# Patient Record
Sex: FEMALE | Race: WHITE | Employment: FULL TIME | URBAN - NONMETROPOLITAN AREA
[De-identification: names, ages, dates, MRNs, and addresses within clinical notes are randomized per-mention and may not be internally consistent; named-entity substitution may affect disease eponyms.]

---

## 2017-01-16 ENCOUNTER — TELEPHONE (OUTPATIENT)
Dept: FAMILY MEDICINE CLINIC | Age: 67
End: 2017-01-16

## 2017-02-02 RX ORDER — NIFEDIPINE 60 MG/1
60 TABLET, EXTENDED RELEASE ORAL 2 TIMES DAILY
Qty: 60 TABLET | Refills: 0 | Status: SHIPPED | OUTPATIENT
Start: 2017-02-02 | End: 2017-02-21 | Stop reason: SDUPTHER

## 2017-02-04 LAB
ANION GAP SERPL CALCULATED.3IONS-SCNC: 14 MMOL/L
BUN BLDV-MCNC: 22 MG/DL (ref 10–20)
CALCIUM SERPL-MCNC: 9.2 MG/DL (ref 8.7–10.8)
CHLORIDE BLD-SCNC: 107 MMOL/L (ref 95–111)
CO2: 26 MMOL/L (ref 21–32)
CREAT SERPL-MCNC: 2.1 MG/DL (ref 0.5–1.3)
EGFR AFRICAN AMERICAN: 28
EGFR IF NONAFRICAN AMERICAN: 23
GLUCOSE: 95 MG/DL (ref 70–100)
POTASSIUM SERPL-SCNC: 3.8 MMOL/L (ref 3.5–5.4)
SODIUM BLD-SCNC: 143 MMOL/L (ref 134–147)

## 2017-02-05 LAB
CREATINE, URINE: 207.4 MG/DL
PROTEIN, URINE: 51 MG/DL

## 2017-02-09 ENCOUNTER — OFFICE VISIT (OUTPATIENT)
Dept: NEPHROLOGY | Age: 67
End: 2017-02-09

## 2017-02-09 VITALS
HEART RATE: 63 BPM | BODY MASS INDEX: 40.86 KG/M2 | DIASTOLIC BLOOD PRESSURE: 92 MMHG | WEIGHT: 227 LBS | OXYGEN SATURATION: 98 % | SYSTOLIC BLOOD PRESSURE: 164 MMHG

## 2017-02-09 DIAGNOSIS — N25.81 SECONDARY HYPERPARATHYROIDISM OF RENAL ORIGIN (HCC): ICD-10-CM

## 2017-02-09 DIAGNOSIS — N18.4 CKD (CHRONIC KIDNEY DISEASE), STAGE IV (HCC): Primary | ICD-10-CM

## 2017-02-09 DIAGNOSIS — I10 ESSENTIAL HYPERTENSION: ICD-10-CM

## 2017-02-09 PROCEDURE — 99213 OFFICE O/P EST LOW 20 MIN: CPT | Performed by: INTERNAL MEDICINE

## 2017-02-09 RX ORDER — ERGOCALCIFEROL (VITAMIN D2) 1250 MCG
50000 CAPSULE ORAL WEEKLY
Qty: 4 CAPSULE | Refills: 3 | Status: SHIPPED | OUTPATIENT
Start: 2017-02-09 | End: 2017-06-08 | Stop reason: SDUPTHER

## 2017-02-10 DIAGNOSIS — I10 UNCONTROLLED HYPERTENSION: ICD-10-CM

## 2017-02-10 RX ORDER — METOPROLOL TARTRATE 50 MG/1
50 TABLET, FILM COATED ORAL 2 TIMES DAILY
Qty: 180 TABLET | Refills: 3 | Status: SHIPPED | OUTPATIENT
Start: 2017-02-10 | End: 2017-08-15 | Stop reason: SDUPTHER

## 2017-02-10 RX ORDER — DOXAZOSIN MESYLATE 4 MG/1
8 TABLET ORAL NIGHTLY
Qty: 60 TABLET | Refills: 3 | Status: SHIPPED | OUTPATIENT
Start: 2017-02-10 | End: 2018-06-13 | Stop reason: SDUPTHER

## 2017-02-10 RX ORDER — CALCITRIOL 0.25 UG/1
0.25 CAPSULE, LIQUID FILLED ORAL DAILY
Qty: 30 CAPSULE | Refills: 3 | Status: SHIPPED | OUTPATIENT
Start: 2017-02-10 | End: 2017-12-14

## 2017-02-14 RX ORDER — HYDRALAZINE HYDROCHLORIDE 25 MG/1
25 TABLET, FILM COATED ORAL 3 TIMES DAILY
Qty: 270 TABLET | Refills: 1 | Status: SHIPPED | OUTPATIENT
Start: 2017-02-14 | End: 2018-08-16 | Stop reason: SDUPTHER

## 2017-02-21 RX ORDER — NIFEDIPINE 60 MG/1
60 TABLET, EXTENDED RELEASE ORAL 2 TIMES DAILY
Qty: 180 TABLET | Refills: 3 | Status: SHIPPED | OUTPATIENT
Start: 2017-02-21 | End: 2018-02-07 | Stop reason: SDUPTHER

## 2017-03-10 LAB
PARATHYROID HORMONE INTACT: 52 PG/ML (ref 11–67)
VITAMIN D 25-HYDROXY: 8 NG/ML

## 2017-04-24 ENCOUNTER — TELEPHONE (OUTPATIENT)
Dept: FAMILY MEDICINE CLINIC | Age: 67
End: 2017-04-24

## 2017-06-01 LAB
ANION GAP SERPL CALCULATED.3IONS-SCNC: 16 MMOL/L
BUN BLDV-MCNC: 27 MG/DL (ref 10–20)
CALCIUM SERPL-MCNC: 9.1 MG/DL (ref 8.7–10.8)
CHLORIDE BLD-SCNC: 107 MMOL/L (ref 95–111)
CO2: 24 MMOL/L (ref 21–32)
CREAT SERPL-MCNC: 2.1 MG/DL (ref 0.5–1.3)
EGFR AFRICAN AMERICAN: 28
EGFR IF NONAFRICAN AMERICAN: 23
GLUCOSE: 94 MG/DL (ref 70–100)
POTASSIUM SERPL-SCNC: 4.1 MMOL/L (ref 3.5–5.4)
SODIUM BLD-SCNC: 143 MMOL/L (ref 134–147)

## 2017-06-02 LAB
ABSOLUTE BASO #: 0.1 K/UL (ref 0–0.1)
ABSOLUTE EOS #: 0.3 K/UL (ref 0.1–0.4)
ABSOLUTE LYMPH #: 2 K/UL (ref 0.8–5.2)
ABSOLUTE MONO #: 0.5 K/UL (ref 0.1–0.9)
ABSOLUTE NEUT #: 3.4 K/UL (ref 1.3–9.1)
BASOPHILS RELATIVE PERCENT: 1 %
EOSINOPHILS RELATIVE PERCENT: 4.4 %
HCT VFR BLD CALC: 36.8 % (ref 36–48)
HEMOGLOBIN: 11.8 G/DL (ref 12–16)
LYMPHOCYTE %: 32.3 %
MCH RBC QN AUTO: 30.5 PG (ref 27–34)
MCHC RBC AUTO-ENTMCNC: 32.1 G/DL (ref 31–36)
MCV RBC AUTO: 95.1 FL (ref 80–100)
MONOCYTES # BLD: 7.9 %
NEUTROPHILS RELATIVE PERCENT: 54.2 %
PDW BLD-RTO: 13.1 % (ref 10.8–14.8)
PLATELETS: 181 K/UL (ref 150–450)
RBC: 3.87 M/UL (ref 4–5.5)
WBC: 6.2 K/UL (ref 3.7–10.8)

## 2017-06-03 LAB — VITAMIN D 25-HYDROXY: 23 NG/ML

## 2017-06-08 ENCOUNTER — OFFICE VISIT (OUTPATIENT)
Dept: NEPHROLOGY | Age: 67
End: 2017-06-08

## 2017-06-08 VITALS
OXYGEN SATURATION: 97 % | DIASTOLIC BLOOD PRESSURE: 84 MMHG | BODY MASS INDEX: 40.95 KG/M2 | HEART RATE: 63 BPM | SYSTOLIC BLOOD PRESSURE: 164 MMHG | WEIGHT: 227.5 LBS

## 2017-06-08 DIAGNOSIS — N18.4 CKD (CHRONIC KIDNEY DISEASE), STAGE IV (HCC): Primary | ICD-10-CM

## 2017-06-08 DIAGNOSIS — I10 ESSENTIAL HYPERTENSION: ICD-10-CM

## 2017-06-08 DIAGNOSIS — N25.81 SECONDARY HYPERPARATHYROIDISM OF RENAL ORIGIN (HCC): ICD-10-CM

## 2017-06-08 PROCEDURE — 99214 OFFICE O/P EST MOD 30 MIN: CPT | Performed by: INTERNAL MEDICINE

## 2017-06-08 RX ORDER — ERGOCALCIFEROL (VITAMIN D2) 1250 MCG
50000 CAPSULE ORAL WEEKLY
Qty: 4 CAPSULE | Refills: 2 | Status: SHIPPED | OUTPATIENT
Start: 2017-06-08 | End: 2017-08-18 | Stop reason: SDUPTHER

## 2017-06-21 RX ORDER — DOXAZOSIN MESYLATE 4 MG/1
8 TABLET ORAL DAILY
Qty: 60 TABLET | Refills: 5 | Status: SHIPPED | OUTPATIENT
Start: 2017-06-21 | End: 2019-07-31 | Stop reason: ALTCHOICE

## 2017-06-21 RX ORDER — CALCITRIOL 0.25 UG/1
0.25 CAPSULE, LIQUID FILLED ORAL DAILY
Qty: 30 CAPSULE | Refills: 5 | Status: SHIPPED | OUTPATIENT
Start: 2017-06-21 | End: 2017-12-14

## 2017-06-29 ENCOUNTER — TELEPHONE (OUTPATIENT)
Dept: FAMILY MEDICINE CLINIC | Age: 67
End: 2017-06-29

## 2017-07-06 RX ORDER — HYDRALAZINE HYDROCHLORIDE 25 MG/1
25 TABLET, FILM COATED ORAL 3 TIMES DAILY
Qty: 90 TABLET | Refills: 3 | Status: SHIPPED | OUTPATIENT
Start: 2017-07-06 | End: 2017-08-28 | Stop reason: SDUPTHER

## 2017-07-17 ENCOUNTER — TELEPHONE (OUTPATIENT)
Dept: FAMILY MEDICINE CLINIC | Age: 67
End: 2017-07-17

## 2017-08-21 RX ORDER — ERGOCALCIFEROL 1.25 MG/1
CAPSULE ORAL
Qty: 4 CAPSULE | Refills: 2 | Status: SHIPPED | OUTPATIENT
Start: 2017-08-21 | End: 2017-12-14

## 2017-08-28 RX ORDER — HYDRALAZINE HYDROCHLORIDE 25 MG/1
TABLET, FILM COATED ORAL
Qty: 90 TABLET | Refills: 3 | Status: SHIPPED | OUTPATIENT
Start: 2017-08-28 | End: 2018-02-07 | Stop reason: SDUPTHER

## 2017-09-29 RX ORDER — HYDRALAZINE HYDROCHLORIDE 25 MG/1
TABLET, FILM COATED ORAL
Qty: 270 TABLET | Refills: 0 | Status: SHIPPED | OUTPATIENT
Start: 2017-09-29 | End: 2018-06-13 | Stop reason: ALTCHOICE

## 2017-12-10 LAB
ANION GAP SERPL CALCULATED.3IONS-SCNC: 12 MMOL/L
BUN BLDV-MCNC: 23 MG/DL (ref 10–20)
CALCIUM SERPL-MCNC: 9.1 MG/DL (ref 8.7–10.8)
CHLORIDE BLD-SCNC: 109 MMOL/L (ref 95–111)
CO2: 25 MMOL/L (ref 21–32)
CREAT SERPL-MCNC: 2 MG/DL (ref 0.5–1.3)
EGFR AFRICAN AMERICAN: 30
EGFR IF NONAFRICAN AMERICAN: 25
GLUCOSE: 96 MG/DL (ref 70–100)
POTASSIUM SERPL-SCNC: 4.5 MMOL/L (ref 3.5–5.4)
SODIUM BLD-SCNC: 141 MMOL/L (ref 134–147)

## 2017-12-14 ENCOUNTER — OFFICE VISIT (OUTPATIENT)
Dept: NEPHROLOGY | Age: 67
End: 2017-12-14
Payer: COMMERCIAL

## 2017-12-14 VITALS
OXYGEN SATURATION: 97 % | HEART RATE: 62 BPM | SYSTOLIC BLOOD PRESSURE: 138 MMHG | DIASTOLIC BLOOD PRESSURE: 84 MMHG | WEIGHT: 221 LBS | BODY MASS INDEX: 39.78 KG/M2

## 2017-12-14 DIAGNOSIS — N18.4 CKD (CHRONIC KIDNEY DISEASE), STAGE IV (HCC): Primary | ICD-10-CM

## 2017-12-14 DIAGNOSIS — N25.81 SECONDARY HYPERPARATHYROIDISM OF RENAL ORIGIN (HCC): ICD-10-CM

## 2017-12-14 DIAGNOSIS — I10 ESSENTIAL HYPERTENSION: ICD-10-CM

## 2017-12-14 PROCEDURE — 99213 OFFICE O/P EST LOW 20 MIN: CPT | Performed by: INTERNAL MEDICINE

## 2017-12-14 NOTE — PROGRESS NOTES
[facility]          Outpatient Follow-Up note         12/14/2017 9:01 AM    Patient Name:   Ramon Gillespie  YOB: 1950  Primary Care Physician:  Erasto Bah CNP     Chief Complaint / Reason for follow-up : Follow Up of chronic kidney disease     Interval History :  Patient seen and examined by me. Feels well. Denies any complaints at this time. Deya Snooks No chest pain shortness of breath at this time. Patient's blood pressures are doing reasonable at home.        Past History :  Past Medical History:   Diagnosis Date    Acquired hypothyroidism 8/30/2016    Cancer [facility])     cervical     Past Surgical History:   Procedure Laterality Date    KNEE SURGERY      ORTHOPEDIC SURGERY      TONSILLECTOMY          Medications :     Outpatient Prescriptions Marked as Taking for the 12/14/17 encounter (Office Visit) with Tayler Rodriguez MD   Medication Sig Dispense Refill    hydrALAZINE (APRESOLINE) 25 MG tablet TAKE ONE TABLET BY MOUTH THREE TIMES A  tablet 0    hydrALAZINE (APRESOLINE) 25 MG tablet TAKE ONE TABLET BY MOUTH THREE TIMES A DAY 90 tablet 3    vitamin D (ERGOCALCIFEROL) 93408 units CAPS capsule TAKE 1 CAPSULE BY MOUTH ONCE A WEEK 4 capsule 2    SYNTHROID 75 MCG tablet TAKE ONE TABLET BY MOUTH ONE TIME A DAY 30 tablet 5    metoprolol tartrate (LOPRESSOR) 50 MG tablet TAKE ONE TABLET BY MOUTH TWICE A  tablet 3    calcitRIOL (ROCALTROL) 0.25 MCG capsule Take 1 capsule by mouth daily 30 capsule 5    doxazosin (CARDURA) 4 MG tablet Take 2 tablets by mouth daily 60 tablet 5    NIFEdipine (NIFEDICAL XL) 60 MG extended release tablet Take 1 tablet by mouth 2 times daily 180 tablet 3    hydrALAZINE (APRESOLINE) 25 MG tablet Take 1 tablet by mouth 3 times daily 270 tablet 1    calcitRIOL (ROCALTROL) 0.25 MCG capsule Take 1 capsule by mouth daily 30 capsule 3    doxazosin (CARDURA) 4 MG tablet Take 2 tablets by mouth nightly 60 tablet 3    clonazePAM

## 2018-02-07 ENCOUNTER — TELEPHONE (OUTPATIENT)
Dept: FAMILY MEDICINE CLINIC | Age: 68
End: 2018-02-07

## 2018-02-07 DIAGNOSIS — E03.9 ACQUIRED HYPOTHYROIDISM: ICD-10-CM

## 2018-02-07 RX ORDER — NIFEDIPINE 60 MG/1
TABLET, EXTENDED RELEASE ORAL
Qty: 180 TABLET | Refills: 1 | Status: SHIPPED | OUTPATIENT
Start: 2018-02-07 | End: 2018-06-13 | Stop reason: SDUPTHER

## 2018-02-07 RX ORDER — LEVOTHYROXINE SODIUM 75 MCG
TABLET ORAL
Qty: 30 TABLET | Refills: 0 | Status: SHIPPED | OUTPATIENT
Start: 2018-02-07 | End: 2018-03-02 | Stop reason: SDUPTHER

## 2018-02-07 RX ORDER — HYDRALAZINE HYDROCHLORIDE 25 MG/1
TABLET, FILM COATED ORAL
Qty: 270 TABLET | Refills: 1 | Status: SHIPPED | OUTPATIENT
Start: 2018-02-07 | End: 2018-06-13 | Stop reason: SDUPTHER

## 2018-02-07 NOTE — TELEPHONE ENCOUNTER
Received a refill request for Nifedipine 60 mg, takes one tablet 2 times daily, a 90 day supply and hydralazine 25 mg, takes one tablet 3 times daily, a 90 day supply from [address] I gave a 90 day with R-1 on both medications and it will be sent to [facility]. The patient's next appointment is on 06-13-18 a 6 month follow up with Dr. Alina King. The last time Nifedipine 60 mg, take one tablet 2 times daily was written was on 02-21-17 #180 R-3 and sent to [facility]. The last time hydralazine 25 mg, take one tablet 3 times a day was written was on 09-29-17 #270 R-0 and sent to [facility]. Dr. Alina King, these two prescriptions are pending please sing if you approve.

## 2018-03-01 DIAGNOSIS — I10 UNCONTROLLED HYPERTENSION: ICD-10-CM

## 2018-03-01 RX ORDER — METOPROLOL TARTRATE 50 MG/1
TABLET, FILM COATED ORAL
Qty: 180 TABLET | Refills: 3 | Status: SHIPPED | OUTPATIENT
Start: 2018-03-01 | End: 2019-04-30 | Stop reason: SDUPTHER

## 2018-03-02 DIAGNOSIS — I10 ESSENTIAL HYPERTENSION: Primary | ICD-10-CM

## 2018-03-02 DIAGNOSIS — E03.9 ACQUIRED HYPOTHYROIDISM: ICD-10-CM

## 2018-03-02 RX ORDER — LEVOTHYROXINE SODIUM 0.07 MG/1
TABLET ORAL
Qty: 30 TABLET | Refills: 0 | Status: SHIPPED | OUTPATIENT
Start: 2018-03-02 | End: 2018-03-12 | Stop reason: SDUPTHER

## 2018-03-09 ENCOUNTER — TELEPHONE (OUTPATIENT)
Dept: FAMILY MEDICINE CLINIC | Age: 68
End: 2018-03-09

## 2018-03-09 LAB
CHOLESTEROL/HDL RATIO: 4.6
CHOLESTEROL: 207 MG/DL
HDLC SERPL-MCNC: 45 MG/DL
LDL CHOLESTEROL CALCULATED: 144 MG/DL
LDL/HDL RATIO: 3.2
TRIGL SERPL-MCNC: 88 MG/DL
TSH SERPL DL<=0.05 MIU/L-ACNC: 3.45 UIU/ML (ref 0.4–4.1)
VLDLC SERPL CALC-MCNC: 18 MG/DL

## 2018-03-12 ENCOUNTER — OFFICE VISIT (OUTPATIENT)
Dept: FAMILY MEDICINE CLINIC | Age: 68
End: 2018-03-12
Payer: COMMERCIAL

## 2018-03-12 VITALS
SYSTOLIC BLOOD PRESSURE: 136 MMHG | BODY MASS INDEX: 38.06 KG/M2 | TEMPERATURE: 97.9 F | DIASTOLIC BLOOD PRESSURE: 84 MMHG | HEART RATE: 76 BPM | WEIGHT: 214.8 LBS | HEIGHT: 63 IN | RESPIRATION RATE: 14 BRPM

## 2018-03-12 DIAGNOSIS — E78.5 DYSLIPIDEMIA: ICD-10-CM

## 2018-03-12 DIAGNOSIS — E03.9 ACQUIRED HYPOTHYROIDISM: ICD-10-CM

## 2018-03-12 DIAGNOSIS — Z23 NEED FOR PNEUMOCOCCAL VACCINATION: ICD-10-CM

## 2018-03-12 DIAGNOSIS — F41.1 GAD (GENERALIZED ANXIETY DISORDER): Primary | ICD-10-CM

## 2018-03-12 DIAGNOSIS — Z12.11 SCREENING FOR COLON CANCER: ICD-10-CM

## 2018-03-12 DIAGNOSIS — I10 ESSENTIAL HYPERTENSION: ICD-10-CM

## 2018-03-12 PROCEDURE — 90670 PCV13 VACCINE IM: CPT | Performed by: NURSE PRACTITIONER

## 2018-03-12 PROCEDURE — 90471 IMMUNIZATION ADMIN: CPT | Performed by: NURSE PRACTITIONER

## 2018-03-12 PROCEDURE — 99214 OFFICE O/P EST MOD 30 MIN: CPT | Performed by: NURSE PRACTITIONER

## 2018-03-12 RX ORDER — LEVOTHYROXINE SODIUM 0.07 MG/1
TABLET ORAL
Qty: 90 TABLET | Refills: 3 | Status: SHIPPED | OUTPATIENT
Start: 2018-03-12 | End: 2019-04-30 | Stop reason: SDUPTHER

## 2018-03-12 RX ORDER — ATORVASTATIN CALCIUM 40 MG/1
40 TABLET, FILM COATED ORAL DAILY
Qty: 90 TABLET | Refills: 3 | Status: SHIPPED | OUTPATIENT
Start: 2018-03-12 | End: 2018-06-13 | Stop reason: ALTCHOICE

## 2018-03-12 ASSESSMENT — PATIENT HEALTH QUESTIONNAIRE - PHQ9
SUM OF ALL RESPONSES TO PHQ QUESTIONS 1-9: 0
1. LITTLE INTEREST OR PLEASURE IN DOING THINGS: 0
SUM OF ALL RESPONSES TO PHQ9 QUESTIONS 1 & 2: 0
2. FEELING DOWN, DEPRESSED OR HOPELESS: 0

## 2018-03-12 NOTE — PROGRESS NOTES
Visit Information    Have you changed or started any medications since your last visit including any over-the-counter medicines, vitamins, or herbal medicines? no   Are you having any side effects from any of your medications? -  no  Have you stopped taking any of your medications? Is so, why? -  no    Have you seen any other physician or provider since your last visit? Yes - Records Obtained  Have you had any other diagnostic tests since your last visit? Yes - Records Obtained  Have you been seen in the emergency room and/or had an admission to a hospital since we last saw you? No  Have you had your routine dental cleaning in the past 6 months? no    Have you activated your [employer] account? If not, what are your barriers?  No: pt declined     Patient Care Team:  Georges Raymundo CNP as PCP - General (Family Medicine)  Georges Raymundo CNP as PCP - [facility] Attributed Provider    Medical History Review  Past Medical, Family, and Social History reviewed and does contribute to the patient presenting condition    Health Maintenance   Topic Date Due    Hepatitis C screen  1950    DTaP/Tdap/Td vaccine (1 - Tdap) 07/13/1967    Breast cancer screen  07/13/1998    Shingles Vaccine (1 of 2 - 2 Dose Series) 07/13/1998    Colon cancer screen colonoscopy  07/13/1998    DEXA (modify frequency per FRAX score)  07/13/2013    Pneumococcal high/highest risk (1 of 2 - PCV13) 07/13/2013    Flu vaccine (1) 09/01/2017    Potassium monitoring  12/09/2018    Creatinine monitoring  12/09/2018    TSH testing  03/08/2019    Lipid screen  03/08/2023
edema? No    BP Readings from Last 3 Encounters:   03/12/18 136/84   12/14/17 138/84   06/08/17 (!) 164/84       Age/Gender Health Maintenance    Lipid - 3/18  DM Screen - 8/16  Colon Cancer Screening - needs  Lung Cancer Screening (Age 54 to [de-identified] with 30 pack year hx, current smoker or quit within past 15 years) - n/a    Tetanus - needs  Influenza Vaccine - next due 8/16  Pneumonia Vaccine - Prevnar 3/18  Zostavax - needs       Breast Cancer Screening - needs, order given 8/23/16  Cervical Cancer Screening - n/a  Osteoporosis Screening - needs, order given 8/23/16  Chlamydia Screen - n/a    AAA Screening - n/a    Falls screening - n/a    Current Outpatient Prescriptions   Medication Sig Dispense Refill    levothyroxine (SYNTHROID) 75 MCG tablet TAKE ONE TABLET BY MOUTH ONE TIME A DAY 90 tablet 3    atorvastatin (LIPITOR) 40 MG tablet Take 1 tablet by mouth daily 90 tablet 3    metoprolol tartrate (LOPRESSOR) 50 MG tablet TAKE 1 TABLET BY MOUTH TWO TIMES A  tablet 3    NIFEdipine (PROCARDIA XL) 60 MG extended release tablet TAKE 1 TABLET BY MOUTH TWICE DAILY 180 tablet 1    hydrALAZINE (APRESOLINE) 25 MG tablet TAKE ONE TABLET BY MOUTH THREE TIMES A  tablet 1    hydrALAZINE (APRESOLINE) 25 MG tablet TAKE ONE TABLET BY MOUTH THREE TIMES A  tablet 0    doxazosin (CARDURA) 4 MG tablet Take 2 tablets by mouth daily 60 tablet 5    hydrALAZINE (APRESOLINE) 25 MG tablet Take 1 tablet by mouth 3 times daily 270 tablet 1    doxazosin (CARDURA) 4 MG tablet Take 2 tablets by mouth nightly 60 tablet 3    clonazePAM (KLONOPIN) 0.5 MG tablet take 1 tablet by mouth twice a day if needed for anxiety 15 tablet 0    NIFEdipine (ADALAT CC) 60 MG extended release tablet Take 1 tablet by mouth 2 times daily 60 tablet 3    acetaminophen (TYLENOL) 500 MG tablet Take 500 mg by mouth every 6 hours as needed for Pain       No current facility-administered medications for this visit.       Orders Placed This

## 2018-06-07 LAB
ABSOLUTE BASO #: 0.1 K/UL (ref 0–0.1)
ABSOLUTE EOS #: 0.3 K/UL (ref 0.1–0.4)
ABSOLUTE LYMPH #: 1.9 K/UL (ref 0.8–5.2)
ABSOLUTE MONO #: 0.4 K/UL (ref 0.1–0.9)
ABSOLUTE NEUT #: 4.2 K/UL (ref 1.3–9.1)
ALBUMIN SERPL-MCNC: 4.4 G/DL (ref 3.5–5.2)
ALK PHOSPHATASE: 166 U/L (ref 30–146)
ALT SERPL-CCNC: 11 U/L (ref 5–40)
ANION GAP SERPL CALCULATED.3IONS-SCNC: 15 MEQ/L (ref 10–19)
AST SERPL-CCNC: 14 U/L (ref 9–40)
BASOPHILS RELATIVE PERCENT: 1 %
BILIRUB SERPL-MCNC: 0.3 MG/DL
BUN BLDV-MCNC: 21 MG/DL (ref 8–23)
CALCIUM SERPL-MCNC: 9.1 MG/DL (ref 8.5–10.5)
CHLORIDE BLD-SCNC: 105 MEQ/L (ref 95–107)
CO2: 25 MEQ/L (ref 19–31)
CREAT SERPL-MCNC: 2 MG/DL (ref 0.6–1.3)
EGFR AFRICAN AMERICAN: 28.8 ML/MIN/1.73 M2
EGFR IF NONAFRICAN AMERICAN: 24.8 ML/MIN/1.73 M2
EOSINOPHILS RELATIVE PERCENT: 4 %
GLUCOSE: 100 MG/DL (ref 70–99)
HCT VFR BLD CALC: 42.5 % (ref 36–48)
HEMOGLOBIN: 14.2 G/DL (ref 12–16)
LYMPHOCYTE %: 27.8 %
MCH RBC QN AUTO: 31.5 PG (ref 27–34)
MCHC RBC AUTO-ENTMCNC: 33.4 G/DL (ref 31–36)
MCV RBC AUTO: 94.2 FL (ref 80–100)
MONOCYTES # BLD: 5.9 %
NEUTROPHILS RELATIVE PERCENT: 61.2 %
PDW BLD-RTO: 13.5 % (ref 10.8–14.8)
PHOSPHORUS: 3.4 MG/DL (ref 2.5–4.5)
PLATELETS: 207 K/UL (ref 150–450)
POTASSIUM SERPL-SCNC: 5 MEQ/L (ref 3.5–5.4)
RBC: 4.51 M/UL (ref 4–5.5)
SODIUM BLD-SCNC: 145 MEQ/L (ref 135–146)
TOTAL PROTEIN: 6.9 G/DL (ref 6.1–8.3)
WBC: 6.9 K/UL (ref 3.7–10.8)

## 2018-06-08 LAB
PARATHYROID HORMONE INTACT: 165 PG/ML (ref 11–67)
VITAMIN D 25-HYDROXY: 20 NG/ML

## 2018-06-13 ENCOUNTER — TELEPHONE (OUTPATIENT)
Dept: NEPHROLOGY | Age: 68
End: 2018-06-13

## 2018-06-13 ENCOUNTER — OFFICE VISIT (OUTPATIENT)
Dept: NEPHROLOGY | Age: 68
End: 2018-06-13
Payer: COMMERCIAL

## 2018-06-13 VITALS
HEART RATE: 62 BPM | OXYGEN SATURATION: 97 % | WEIGHT: 224 LBS | DIASTOLIC BLOOD PRESSURE: 75 MMHG | HEIGHT: 63 IN | SYSTOLIC BLOOD PRESSURE: 159 MMHG | BODY MASS INDEX: 39.69 KG/M2

## 2018-06-13 DIAGNOSIS — N25.81 SECONDARY HYPERPARATHYROIDISM OF RENAL ORIGIN (HCC): ICD-10-CM

## 2018-06-13 DIAGNOSIS — I10 ESSENTIAL HYPERTENSION: ICD-10-CM

## 2018-06-13 DIAGNOSIS — N18.4 CKD (CHRONIC KIDNEY DISEASE), STAGE IV (HCC): Primary | ICD-10-CM

## 2018-06-13 PROCEDURE — 99213 OFFICE O/P EST LOW 20 MIN: CPT | Performed by: INTERNAL MEDICINE

## 2018-06-13 RX ORDER — CALCITRIOL 0.25 UG/1
0.25 CAPSULE, LIQUID FILLED ORAL DAILY
Qty: 90 CAPSULE | Refills: 3 | Status: SHIPPED | OUTPATIENT
Start: 2018-06-13 | End: 2018-06-13

## 2018-06-20 ENCOUNTER — TELEPHONE (OUTPATIENT)
Dept: FAMILY MEDICINE CLINIC | Age: 68
End: 2018-06-20

## 2018-07-06 ENCOUNTER — TELEPHONE (OUTPATIENT)
Dept: FAMILY MEDICINE CLINIC | Age: 68
End: 2018-07-06

## 2018-08-17 RX ORDER — HYDRALAZINE HYDROCHLORIDE 25 MG/1
25 TABLET, FILM COATED ORAL 3 TIMES DAILY
Qty: 270 TABLET | Refills: 1 | Status: SHIPPED | OUTPATIENT
Start: 2018-08-17 | End: 2019-03-01 | Stop reason: SDUPTHER

## 2018-08-20 DIAGNOSIS — I10 UNCONTROLLED HYPERTENSION: ICD-10-CM

## 2018-08-20 RX ORDER — NIFEDIPINE 60 MG/1
60 TABLET, FILM COATED, EXTENDED RELEASE ORAL 2 TIMES DAILY
Qty: 180 TABLET | Refills: 1 | Status: SHIPPED | OUTPATIENT
Start: 2018-08-20 | End: 2019-03-01 | Stop reason: SDUPTHER

## 2019-03-01 DIAGNOSIS — I10 UNCONTROLLED HYPERTENSION: ICD-10-CM

## 2019-03-01 RX ORDER — HYDRALAZINE HYDROCHLORIDE 25 MG/1
TABLET, FILM COATED ORAL
Qty: 270 TABLET | Refills: 3 | Status: SHIPPED | OUTPATIENT
Start: 2019-03-01 | End: 2020-01-01 | Stop reason: SDUPTHER

## 2019-03-01 RX ORDER — NIFEDIPINE 60 MG/1
TABLET, FILM COATED, EXTENDED RELEASE ORAL
Qty: 180 TABLET | Refills: 3 | Status: SHIPPED | OUTPATIENT
Start: 2019-03-01 | End: 2020-03-13

## 2019-04-29 DIAGNOSIS — E03.9 ACQUIRED HYPOTHYROIDISM: ICD-10-CM

## 2019-04-29 DIAGNOSIS — I10 UNCONTROLLED HYPERTENSION: ICD-10-CM

## 2019-04-29 RX ORDER — LEVOTHYROXINE SODIUM 0.07 MG/1
TABLET ORAL
Qty: 90 TABLET | Refills: 3 | OUTPATIENT
Start: 2019-04-29

## 2019-04-29 RX ORDER — METOPROLOL TARTRATE 50 MG/1
TABLET, FILM COATED ORAL
Qty: 180 TABLET | Refills: 3 | OUTPATIENT
Start: 2019-04-29

## 2019-04-30 ENCOUNTER — NURSE ONLY (OUTPATIENT)
Dept: LAB | Age: 69
End: 2019-04-30

## 2019-04-30 ENCOUNTER — OFFICE VISIT (OUTPATIENT)
Dept: FAMILY MEDICINE CLINIC | Age: 69
End: 2019-04-30
Payer: COMMERCIAL

## 2019-04-30 VITALS
TEMPERATURE: 98.5 F | BODY MASS INDEX: 41.78 KG/M2 | HEIGHT: 63 IN | RESPIRATION RATE: 14 BRPM | SYSTOLIC BLOOD PRESSURE: 138 MMHG | HEART RATE: 73 BPM | DIASTOLIC BLOOD PRESSURE: 86 MMHG | WEIGHT: 235.8 LBS

## 2019-04-30 DIAGNOSIS — I10 ESSENTIAL HYPERTENSION: ICD-10-CM

## 2019-04-30 DIAGNOSIS — E03.9 ACQUIRED HYPOTHYROIDISM: ICD-10-CM

## 2019-04-30 DIAGNOSIS — E78.5 DYSLIPIDEMIA: ICD-10-CM

## 2019-04-30 DIAGNOSIS — R01.1 HEART MURMUR: ICD-10-CM

## 2019-04-30 DIAGNOSIS — R73.01 IMPAIRED FASTING GLUCOSE: ICD-10-CM

## 2019-04-30 DIAGNOSIS — R73.01 IMPAIRED FASTING GLUCOSE: Primary | ICD-10-CM

## 2019-04-30 LAB
ALBUMIN SERPL-MCNC: 3.8 G/DL (ref 3.5–5.1)
ALP BLD-CCNC: 175 U/L (ref 38–126)
ALT SERPL-CCNC: 18 U/L (ref 11–66)
ANION GAP SERPL CALCULATED.3IONS-SCNC: 18 MEQ/L (ref 8–16)
AST SERPL-CCNC: 18 U/L (ref 5–40)
AVERAGE GLUCOSE: 93 MG/DL (ref 70–126)
BASOPHILS # BLD: 1.3 %
BASOPHILS ABSOLUTE: 0.1 THOU/MM3 (ref 0–0.1)
BILIRUB SERPL-MCNC: 0.3 MG/DL (ref 0.3–1.2)
BUN BLDV-MCNC: 23 MG/DL (ref 7–22)
CALCIUM SERPL-MCNC: 8.7 MG/DL (ref 8.5–10.5)
CHLORIDE BLD-SCNC: 109 MEQ/L (ref 98–111)
CHOLESTEROL, FASTING: 195 MG/DL (ref 100–199)
CO2: 20 MEQ/L (ref 23–33)
CREAT SERPL-MCNC: 2.3 MG/DL (ref 0.4–1.2)
EOSINOPHIL # BLD: 3.5 %
EOSINOPHILS ABSOLUTE: 0.2 THOU/MM3 (ref 0–0.4)
ERYTHROCYTE [DISTWIDTH] IN BLOOD BY AUTOMATED COUNT: 13.6 % (ref 11.5–14.5)
ERYTHROCYTE [DISTWIDTH] IN BLOOD BY AUTOMATED COUNT: 50.3 FL (ref 35–45)
GFR SERPL CREATININE-BSD FRML MDRD: 21 ML/MIN/1.73M2
GLUCOSE BLD-MCNC: 126 MG/DL (ref 70–108)
HBA1C MFR BLD: 5.1 % (ref 4.4–6.4)
HCT VFR BLD CALC: 42.4 % (ref 37–47)
HDLC SERPL-MCNC: 43 MG/DL
HEMOGLOBIN: 13.5 GM/DL (ref 12–16)
IMMATURE GRANS (ABS): 0.01 THOU/MM3 (ref 0–0.07)
IMMATURE GRANULOCYTES: 0.2 %
LDL CHOLESTEROL CALCULATED: 121 MG/DL
LYMPHOCYTES # BLD: 23.7 %
LYMPHOCYTES ABSOLUTE: 1.3 THOU/MM3 (ref 1–4.8)
MCH RBC QN AUTO: 32.1 PG (ref 26–33)
MCHC RBC AUTO-ENTMCNC: 31.8 GM/DL (ref 32.2–35.5)
MCV RBC AUTO: 100.7 FL (ref 81–99)
MONOCYTES # BLD: 8.4 %
MONOCYTES ABSOLUTE: 0.5 THOU/MM3 (ref 0.4–1.3)
NUCLEATED RED BLOOD CELLS: 0 /100 WBC
PLATELET # BLD: 196 THOU/MM3 (ref 130–400)
PMV BLD AUTO: 11.4 FL (ref 9.4–12.4)
POTASSIUM SERPL-SCNC: 4.2 MEQ/L (ref 3.5–5.2)
RBC # BLD: 4.21 MILL/MM3 (ref 4.2–5.4)
SEG NEUTROPHILS: 62.9 %
SEGMENTED NEUTROPHILS ABSOLUTE COUNT: 3.5 THOU/MM3 (ref 1.8–7.7)
SODIUM BLD-SCNC: 147 MEQ/L (ref 135–145)
TOTAL PROTEIN: 7 G/DL (ref 6.1–8)
TRIGLYCERIDE, FASTING: 157 MG/DL (ref 0–199)
TSH SERPL DL<=0.05 MIU/L-ACNC: 4.09 UIU/ML (ref 0.4–4.2)
WBC # BLD: 5.5 THOU/MM3 (ref 4.8–10.8)

## 2019-04-30 PROCEDURE — 99214 OFFICE O/P EST MOD 30 MIN: CPT | Performed by: NURSE PRACTITIONER

## 2019-04-30 RX ORDER — PRAVASTATIN SODIUM 80 MG/1
80 TABLET ORAL DAILY
Qty: 90 TABLET | Refills: 0 | Status: SHIPPED | OUTPATIENT
Start: 2019-04-30 | End: 2019-07-26 | Stop reason: SDUPTHER

## 2019-04-30 RX ORDER — METOPROLOL TARTRATE 50 MG/1
TABLET, FILM COATED ORAL
Qty: 180 TABLET | Refills: 0 | Status: SHIPPED | OUTPATIENT
Start: 2019-04-30 | End: 2019-07-26 | Stop reason: SDUPTHER

## 2019-04-30 RX ORDER — LEVOTHYROXINE SODIUM 0.07 MG/1
TABLET ORAL
Qty: 90 TABLET | Refills: 0 | Status: SHIPPED | OUTPATIENT
Start: 2019-04-30 | End: 2019-07-26 | Stop reason: SDUPTHER

## 2019-04-30 ASSESSMENT — PATIENT HEALTH QUESTIONNAIRE - PHQ9
1. LITTLE INTEREST OR PLEASURE IN DOING THINGS: 1
SUM OF ALL RESPONSES TO PHQ9 QUESTIONS 1 & 2: 2
SUM OF ALL RESPONSES TO PHQ QUESTIONS 1-9: 2
2. FEELING DOWN, DEPRESSED OR HOPELESS: 1
SUM OF ALL RESPONSES TO PHQ QUESTIONS 1-9: 2

## 2019-04-30 NOTE — PROGRESS NOTES
Chief Complaint   Patient presents with    Medication Refill     Pt presents for med refills. History obtained from chart review and the patient. SUBJECTIVE:  Kenn Magallanes is a 77 y.o. female that presents today for follow up chronic conditions. Dyslipidemia    Current Medication regimen - nothing  Tolerating medications well?  n/a  Personal History of CAD? No   Hx of CAD in first degree relatives? Yes  Current smoker? No  History of HTN? Yes  History of DM? No  Goal LDL - < 100    Shortness of breath or chest pain? No  Neurologic changes? No  Extremity edema? No    Lab Results   Component Value Date    LDLCALC 144 (H) 03/08/2018     BP Readings from Last 3 Encounters:   04/30/19 138/86   06/13/18 (!) 159/75   03/12/18 136/84     Wt Readings from Last 3 Encounters:   04/30/19 235 lb 12.8 oz (107 kg)   06/13/18 224 lb (101.6 kg)   03/12/18 214 lb 12.8 oz (97.4 kg)     Hypothyroidism    HPI:  Currently treated for Hypothyroidism? Yes  Fatigue? No  Recent change in weight? Yes - is losing weight but she is working on this  Cold/Heat intolerance? No  Diarrhea/Constipation? No  Diaphoresis? No  Anxiety? Yes - but manageable  Palpitations? No   Hair Loss? Yes    Wt Readings from Last 3 Encounters:   04/30/19 235 lb 12.8 oz (107 kg)   06/13/18 224 lb (101.6 kg)   03/12/18 214 lb 12.8 oz (97.4 kg)       HTN    Does patient check BP regularly at home? - Yes - 767 systolic, [phone]  Current Medication regimen - Lopressor, Procardiac, Hydralazine. she stopped the Cardura because she was taking it at night and when she woke up in the mornings she felt so groggy and could hardly wake up. Has stopped it about 6 months  Tolerating medications well? - yes    Shortness of breath or chest pain? No  Headache or visual complaints? No  Neurologic changes like confusion? No  Extremity edema?  No    BP Readings from Last 3 Encounters:   04/30/19 138/86   06/13/18 (!) 159/75   03/12/18 136/84 tablet     Sig: TAKE 1 TABLET BY MOUTH TWO TIMES A DAY     Dispense:  180 tablet     Refill:  0    levothyroxine (SYNTHROID) 75 MCG tablet     Sig: TAKE ONE TABLET BY MOUTH ONE TIME A DAY     Dispense:  90 tablet     Refill:  0    pravastatin (PRAVACHOL) 80 MG tablet     Sig: Take 1 tablet by mouth daily     Dispense:  90 tablet     Refill:  0         All medications reviewed and reconciled, including OTC and herbal medications. Updated list given to patient.        Patient Active Problem List   Diagnosis    CKD (chronic kidney disease), stage IV (HCC)    Essential hypertension    [place] (generalized anxiety disorder)    Acquired hypothyroidism    [facility]    Dyslipidemia       Past Medical History:   Diagnosis Date    Acquired hypothyroidism 8/30/2016    Cancer ([facility]     cervical       Past Surgical History:   Procedure Laterality Date    KNEE SURGERY      ORTHOPEDIC SURGERY      TONSILLECTOMY         Allergies   Allergen Reactions    Atarax [Hydroxyzine] Nausea And Vomiting       Social History     Socioeconomic History    Marital status:      Spouse name: Not on file    Number of children: Not on file    Years of education: Not on file    Highest education level: Not on file   Occupational History    Not on file   Social Needs    Financial resource strain: Not on file    Food insecurity:     Worry: Not on file     Inability: Not on file    Transportation needs:     Medical: Not on file     Non-medical: Not on file   Tobacco Use    Smoking status: Former Smoker     Packs/day: 0.50     Years: 30.00     Pack years: 15.00     Last attempt to quit: 1/12/2016     Years since quitting: 3.2    Smokeless tobacco: Never Used   Substance and Sexual Activity    Alcohol use: No    Drug use: No    Sexual activity: Not on file   Lifestyle    Physical activity:     Days per week: Not on file     Minutes per session: Not on file    Stress: Not on file Relationships    Social connections:     Talks on phone: Not on file     Gets together: Not on file     Attends [employer] service: Not on file     Active member of club or organization: Not on file     Attends meetings of clubs or organizations: Not on file     Relationship status: Not on file    Intimate partner violence:     Fear of current or ex partner: Not on file     Emotionally abused: Not on file     Physically abused: Not on file     Forced sexual activity: Not on file   Other Topics Concern    Not on file   Social History Narrative    Not on file       Family History   Problem Relation Age of Onset    Depression Mother     Diabetes Mother     Heart Disease Mother     Cancer Father     High Blood Pressure Father     Stroke Father          I have reviewed the patient's past medical history, past surgical history, allergies, medications, social and family history and I have made updates where appropriate.       Review of Systems  Positive responses are highlighted in bold    Constitutional:  Fever, Chills, Night Sweats, Fatigue, Unexpected changes in weight  Eyes:  Eye discharge, Eye pain, Eye redness, Visual disturbances   HENT:  Ear pain, Tinnitus, Nosebleeds, Trouble swallowing, Hearing loss, Sore throat  Cardiovascular:  Chest Pain, Palpitations, Orthopnea, Paroxysmal Nocturnal Dyspnea  Respiratory:  Cough, Wheezing, Shortness of breath, Chest tightness, Apnea  Gastrointestinal:  Nausea, Vomiting, Diarrhea, Constipation, Heartburn, Blood in stool  Genitourinary:  Difficulty or painful urination, Flank pain, Change in frequency, Urgency  Skin:  Color change, Rash, Itching, Wound  Psychiatric:  Hallucinations, Anxiety, Depression, Suicidal ideation  Hematological:  Enlarged glands, Easy bleeding, Easily bruising  Musculoskeletal:  Joint pain, Back pain, Gait problems, Joint swelling, Myalgias  Neurological:  Dizziness, Headaches, Presyncope, Numbness, Seizures, Tremors  Allergy:  Environmental strength globally and symmetrically  Psych: Affect appropriate. Mood normal. Thought process is normal without evidence of depression or psychosis. Good insight and appropriate interaction. Cognition and memory appear to be intact. Skin: warm and dry, no rash or erythema  Lymph:  No cervical, auricular or supraclavicular lymph nodes palpated    ASSESSMENT & PLAN  Radha Galan was seen today for medication refill. Diagnoses and all orders for this visit:    Impaired fasting glucose  -     Hemoglobin A1C; Future    Uncontrolled hypertension    Acquired hypothyroidism  -     levothyroxine (SYNTHROID) 75 MCG tablet; TAKE ONE TABLET BY MOUTH ONE TIME A DAY  -     TSH With Reflex Ft4; Future    Essential hypertension  -     metoprolol tartrate (LOPRESSOR) 50 MG tablet; TAKE 1 TABLET BY MOUTH TWO TIMES A DAY  -     Comprehensive Metabolic Panel; Future  -     CBC With Auto Differential; Future  -     TSH With Reflex Ft4; Future  -     Lipid, Fasting; Future    Dyslipidemia  -     Comprehensive Metabolic Panel; Future  -     Lipid, Fasting; Future  -     pravastatin (PRAVACHOL) 80 MG tablet; Take 1 tablet by mouth daily    Heart murmur  -     Echocardiogram complete; Future      - con't Metoprolol, Hydralazine, Adalat for HTN  - obtain labs  - start Pravastatin for cholesterol, benefits, risks and SE discussed  - con't Synthroid 75 mcg  - declines colon cancer screenings, mammogram at this time. Discussed Cologuard and she declines for now    DISPOSITION    Return in about 3 months (around 7/30/2019) for HTN, hyperlipidemia. Radha Galan released without restrictions. PATIENT COUNSELING    Counseling was provided today regarding the following topics: Healthy eating habits, Regular exercise, substance abuse and healthy sleep habits.     Radha Galan received counseling on the following healthy behaviors: medication adherence    Patient given educational materials on: See Attached    I have instructed Radha Galan to complete a self tracking handout on Blood Pressures  and instructed them to bring it with them to her next appointment. Barriers to learning and self management: none    Discussed use, benefit, and side effects of prescribed medications. Barriers to medication compliance addressed. All patient questions answered. Pt voiced understanding.        Electronically signed by MARQUEZ Mcgee CNP on 4/30/2019 at 9:21 AM

## 2019-04-30 NOTE — PROGRESS NOTES
Visit Information    Have you changed or started any medications since your last visit including any over-the-counter medicines, vitamins, or herbal medicines? no   Are you having any side effects from any of your medications? -  no  Have you stopped taking any of your medications? Is so, why? -  no    Have you seen any other physician or provider since your last visit? Yes - Records Obtained  Have you had any other diagnostic tests since your last visit? No  Have you been seen in the emergency room and/or had an admission to a hospital since we last saw you? No  Have you had your routine dental cleaning in the past 6 months? no    Have you activated your [employer] account? If not, what are your barriers?  No: Pt declined     Patient Care Team:  MARQUEZ Waters CNP as PCP - General (Family Medicine)  MARQUEZ Waters CNP as PCP - S Attributed Provider    Medical History Review  Past Medical, Family, and Social History reviewed and does contribute to the patient presenting condition    Health Maintenance   Topic Date Due    Hepatitis C screen  1950    Hepatitis B Vaccine (1 of 3 - Risk 3-dose series) 07/13/1967    DTaP/Tdap/Td vaccine (1 - Tdap) 07/13/1967    Breast cancer screen  07/13/1998    Shingles Vaccine (1 of 2) 07/13/1998    Colon cancer screen colonoscopy  07/13/1998    DEXA (modify frequency per FRAX score)  07/13/2013    TSH testing  03/08/2019    Pneumococcal 65+ years Vaccine (2 of 2 - PPSV23) 03/12/2019    Potassium monitoring  06/06/2019    Creatinine monitoring  06/06/2019    Flu vaccine (Season Ended) 09/01/2019    Lipid screen  03/08/2023    HPV vaccine  Aged Out

## 2019-05-01 ENCOUNTER — TELEPHONE (OUTPATIENT)
Dept: FAMILY MEDICINE CLINIC | Age: 69
End: 2019-05-01

## 2019-05-01 DIAGNOSIS — R74.8 ELEVATED ALKALINE PHOSPHATASE LEVEL: Primary | ICD-10-CM

## 2019-05-01 DIAGNOSIS — E55.9 VITAMIN D DEFICIENCY: ICD-10-CM

## 2019-05-01 NOTE — TELEPHONE ENCOUNTER
Pt returned our call, was given the results & she verbalized understanding. Pt was also notified the lab orders were being mailed to her address listed in her chart.

## 2019-06-05 ENCOUNTER — NURSE ONLY (OUTPATIENT)
Dept: LAB | Age: 69
End: 2019-06-05

## 2019-06-05 DIAGNOSIS — I10 ESSENTIAL HYPERTENSION: ICD-10-CM

## 2019-06-05 DIAGNOSIS — N25.81 SECONDARY HYPERPARATHYROIDISM OF RENAL ORIGIN (HCC): ICD-10-CM

## 2019-06-05 DIAGNOSIS — N18.4 CKD (CHRONIC KIDNEY DISEASE), STAGE IV (HCC): ICD-10-CM

## 2019-06-05 LAB
ANION GAP SERPL CALCULATED.3IONS-SCNC: 13 MEQ/L (ref 8–16)
BUN BLDV-MCNC: 18 MG/DL (ref 7–22)
CALCIUM SERPL-MCNC: 9.2 MG/DL (ref 8.5–10.5)
CHLORIDE BLD-SCNC: 107 MEQ/L (ref 98–111)
CO2: 24 MEQ/L (ref 23–33)
CREAT SERPL-MCNC: 2 MG/DL (ref 0.4–1.2)
GFR SERPL CREATININE-BSD FRML MDRD: 25 ML/MIN/1.73M2
GLUCOSE BLD-MCNC: 107 MG/DL (ref 70–108)
POTASSIUM SERPL-SCNC: 4.3 MEQ/L (ref 3.5–5.2)
SODIUM BLD-SCNC: 144 MEQ/L (ref 135–145)

## 2019-06-19 ENCOUNTER — NURSE ONLY (OUTPATIENT)
Dept: LAB | Age: 69
End: 2019-06-19

## 2019-06-19 ENCOUNTER — OFFICE VISIT (OUTPATIENT)
Dept: NEPHROLOGY | Age: 69
End: 2019-06-19
Payer: COMMERCIAL

## 2019-06-19 VITALS
HEART RATE: 66 BPM | BODY MASS INDEX: 41.38 KG/M2 | WEIGHT: 233.6 LBS | SYSTOLIC BLOOD PRESSURE: 149 MMHG | DIASTOLIC BLOOD PRESSURE: 80 MMHG | OXYGEN SATURATION: 98 %

## 2019-06-19 DIAGNOSIS — N25.81 SECONDARY HYPERPARATHYROIDISM OF RENAL ORIGIN (HCC): ICD-10-CM

## 2019-06-19 DIAGNOSIS — I10 UNCONTROLLED HYPERTENSION: ICD-10-CM

## 2019-06-19 DIAGNOSIS — N18.4 CKD (CHRONIC KIDNEY DISEASE), STAGE IV (HCC): ICD-10-CM

## 2019-06-19 DIAGNOSIS — I10 UNCONTROLLED HYPERTENSION: Primary | ICD-10-CM

## 2019-06-19 LAB
PHOSPHORUS: 3.7 MG/DL (ref 2.4–4.7)
PTH INTACT: 224 PG/ML (ref 15–65)
VITAMIN D 25-HYDROXY: 16 NG/ML (ref 30–100)

## 2019-06-19 PROCEDURE — 99214 OFFICE O/P EST MOD 30 MIN: CPT | Performed by: INTERNAL MEDICINE

## 2019-06-19 NOTE — PROGRESS NOTES
[facility]          Outpatient Follow-Up note         6/19/2019 2:00 PM    Patient Name:   Tiffany Light:    1950  Primary Care Physician:  MARQUEZ Mijares CNP     Chief Complaint / Reason for follow-up : Follow Up of chronic kidney disease     Interval History :  Patient seen and examined by me. Feels well. Denies any complaints at this time. Sabina Hernan No chest pain shortness of breath at this time. Patient's blood pressures are doing reasonable at home. Past History :  Past Medical History:   Diagnosis Date    Acquired hypothyroidism 8/30/2016    Cancer [facility])     cervical     Past Surgical History:   Procedure Laterality Date    KNEE SURGERY      ORTHOPEDIC SURGERY      TONSILLECTOMY          Medications :     Outpatient Medications Marked as Taking for the 6/19/19 encounter (Office Visit) with Anai Arzate MD   Medication Sig Dispense Refill    metoprolol tartrate (LOPRESSOR) 50 MG tablet TAKE 1 TABLET BY MOUTH TWO TIMES A  tablet 0    levothyroxine (SYNTHROID) 75 MCG tablet TAKE ONE TABLET BY MOUTH ONE TIME A DAY 90 tablet 0    pravastatin (PRAVACHOL) 80 MG tablet Take 1 tablet by mouth daily 90 tablet 0    NIFEdipine (ADALAT CC) 60 MG extended release tablet TAKE 1 TABLET BY MOUTH TWICE DAILY.  180 tablet 3    hydrALAZINE (APRESOLINE) 25 MG tablet TAKE 1 TABLET BY MOUTH 3 TIMES A  tablet 3    acetaminophen (TYLENOL) 500 MG tablet Take 500 mg by mouth every 6 hours as needed for Pain         Vitals     BP (!) 149/80 (Site: Left Upper Arm, Position: Sitting, Cuff Size: Medium Adult)   Pulse 66   Wt 233 lb 9.6 oz (106 kg)   SpO2 98%   BMI 41.38 kg/m²  Wt Readings from Last 3 Encounters:   06/19/19 233 lb 9.6 oz (106 kg)   04/30/19 235 lb 12.8 oz (107 kg)   06/13/18 224 lb (101.6 kg)        Physical Exam     General -- well developed and well nourished  Lungs -- clear  Heart -- S1, S2 heard, JVD - no  Abdomen - soft, non-tender  Extremities -- no edema  CNS - awake and alert    Labs, Radiology and Tests    Labs -    8/16 9/16 2/17 6/17 12/17 6/18 6/19     Potassium 3.7 4.0 3.8 4.1 4.5 5.0 4.3     BUN 21 25 22 27 23 21 18     Creatinine 2.2 1.9 2.1 2.1 2.0 2.0 2.0     eGFR 22 26 23 23 25 25 25     ca 9.2 9.0 9.2 9.1 9.1 9.1 9.2     iPTH 92 89 52   165                  UPCR  12/54 51/207         [facility]                          6/18 - phosphorus 3.4, hemoglobin 14.5 vitamin D 20     Renal Ultrasound Scan -- 8/2016  Right kidney 10.0 cm and left kidney 9.1 cm  Normal echogenicity and no masses no hydronephrosis  No evidence of renal artery stenosis     Assessment      1. Renal  -- CKD stage 4 most likely secondary to long standing uncontrolled hypertension and senile Nephrosclerosis    - Renal function overall stable within the last 36 months. Her GFR is around 25 ML per minute minute. - Volume status stable    2. Essential Hypertension -- running well at home. Currently reasonable. Restart cardura 2 mg   3. Primary hypothyroidism- on levothyroxine. 4. Secondary hyperparathyroidism most likely renal origin. PTH rising vitamin D- 20. Start calcitriol. Did not receive rayaldee. Will try again  5. Avoid Nephrotoxins, Nonsteroidal anti-inflammatories and IV Contrast Dye   6. Follow up in 12 months. Bmp in 6 months     Tests and orders placed this Encounter     Orders Placed This Encounter   Procedures    PTH, Intact    Phosphorus    Vitamin D 25 Hydroxy    Basic Metabolic Panel    Basic Metabolic Panel    Phosphorus    PTH, Intact    Vitamin D 25 Hydroxy       Amber Robert M.D  [facility].

## 2019-06-20 ENCOUNTER — TELEPHONE (OUTPATIENT)
Dept: NEPHROLOGY | Age: 69
End: 2019-06-20

## 2019-06-20 NOTE — TELEPHONE ENCOUNTER
Patient called with some questions regarding Alexarturoee. Informed her it is a take at bedtime medication. I am mailing Q&A pamphlet to the patients address.

## 2019-06-20 NOTE — TELEPHONE ENCOUNTER
Attempted to contact patient regarding Rayaldee that Dr. Alfred De Dios wanted to try again on patient. Pt did call back, and spoke with Esperanza Tian. Patient aware that [employer] would be contacting her regarding the medication Rayaldee.

## 2019-06-20 NOTE — TELEPHONE ENCOUNTER
----- Message from Mp Lucas MD sent at 6/20/2019  8:42 AM EDT -----  Please order rayaldee 30 po daily . And please follow it up until she gets it.  If any issue let me know

## 2019-06-25 ENCOUNTER — TELEPHONE (OUTPATIENT)
Dept: NEPHROLOGY | Age: 69
End: 2019-06-25

## 2019-06-25 NOTE — TELEPHONE ENCOUNTER
Patient called office to see if the Rayaldee was submitted on our end. Order was faxed on 6/20 to [facility]. Call placed to [facility]. It was sent to patients insurance for prior auth, however was sent to the wrong place and had to be resubmitted today to a different number. Notified patient that [facility] would be calling her once the authorization is received from her insurance. They would call to either set up shipment or to help patient apply for financial assistance. Patient verbalized understanding. sue

## 2019-06-26 NOTE — TELEPHONE ENCOUNTER
Spoke with [employer]. They use Cover My Meds up until the questions then they submit their answers to the insurance company for approval. [employer] is waiting prior auth for medication at this time.

## 2019-06-26 NOTE — TELEPHONE ENCOUNTER
Kai Bacon from [facility] my meds called about the prior auth.  Janell Call him back at [phone] Ref # [id number]

## 2019-07-02 ENCOUNTER — TELEPHONE (OUTPATIENT)
Dept: NEPHROLOGY | Age: 69
End: 2019-07-02

## 2019-07-02 NOTE — TELEPHONE ENCOUNTER
Spoke with [facility] from [facility]. She is going to check on any other options. She is afraid that this may one that does not go threw for the Rayaldee. Stated that 9/10 patients do get help and coverage. [facility] to be in contact with me this afternoon to explore any options.

## 2019-07-03 RX ORDER — CALCITRIOL 0.25 UG/1
0.25 CAPSULE, LIQUID FILLED ORAL DAILY
Qty: 90 CAPSULE | Refills: 5 | Status: SHIPPED | OUTPATIENT
Start: 2019-07-03 | End: 2021-01-01 | Stop reason: SDUPTHER

## 2019-07-03 RX ORDER — ERGOCALCIFEROL 1.25 MG/1
50000 CAPSULE ORAL WEEKLY
Qty: 36 CAPSULE | Refills: 5 | Status: SHIPPED | OUTPATIENT
Start: 2019-07-03 | End: 2020-01-01

## 2019-07-26 DIAGNOSIS — I10 ESSENTIAL HYPERTENSION: ICD-10-CM

## 2019-07-26 DIAGNOSIS — E03.9 ACQUIRED HYPOTHYROIDISM: ICD-10-CM

## 2019-07-26 DIAGNOSIS — E78.5 DYSLIPIDEMIA: ICD-10-CM

## 2019-07-26 RX ORDER — METOPROLOL TARTRATE 50 MG/1
TABLET, FILM COATED ORAL
Qty: 180 TABLET | Refills: 0 | Status: SHIPPED | OUTPATIENT
Start: 2019-07-26 | End: 2019-10-25 | Stop reason: SDUPTHER

## 2019-07-26 RX ORDER — LEVOTHYROXINE SODIUM 0.07 MG/1
TABLET ORAL
Qty: 90 TABLET | Refills: 0 | Status: SHIPPED | OUTPATIENT
Start: 2019-07-26 | End: 2019-10-24 | Stop reason: SDUPTHER

## 2019-07-26 RX ORDER — PRAVASTATIN SODIUM 80 MG/1
TABLET ORAL
Qty: 90 TABLET | Refills: 0 | Status: SHIPPED | OUTPATIENT
Start: 2019-07-26 | End: 2019-10-25 | Stop reason: SDUPTHER

## 2019-07-31 ENCOUNTER — OFFICE VISIT (OUTPATIENT)
Dept: FAMILY MEDICINE CLINIC | Age: 69
End: 2019-07-31
Payer: COMMERCIAL

## 2019-07-31 ENCOUNTER — TELEPHONE (OUTPATIENT)
Dept: FAMILY MEDICINE CLINIC | Age: 69
End: 2019-07-31

## 2019-07-31 ENCOUNTER — NURSE ONLY (OUTPATIENT)
Dept: LAB | Age: 69
End: 2019-07-31

## 2019-07-31 VITALS
DIASTOLIC BLOOD PRESSURE: 88 MMHG | TEMPERATURE: 98.3 F | BODY MASS INDEX: 41.85 KG/M2 | RESPIRATION RATE: 16 BRPM | SYSTOLIC BLOOD PRESSURE: 128 MMHG | WEIGHT: 236.2 LBS | HEIGHT: 63 IN | HEART RATE: 71 BPM

## 2019-07-31 DIAGNOSIS — Z23 NEED FOR PNEUMOCOCCAL VACCINATION: ICD-10-CM

## 2019-07-31 DIAGNOSIS — E78.5 DYSLIPIDEMIA: ICD-10-CM

## 2019-07-31 DIAGNOSIS — I10 ESSENTIAL HYPERTENSION: Primary | ICD-10-CM

## 2019-07-31 LAB
ALBUMIN SERPL-MCNC: 3.9 G/DL (ref 3.5–5.1)
ALP BLD-CCNC: 160 U/L (ref 38–126)
ALT SERPL-CCNC: 13 U/L (ref 11–66)
AST SERPL-CCNC: 16 U/L (ref 5–40)
BILIRUB SERPL-MCNC: < 0.2 MG/DL (ref 0.3–1.2)
BILIRUBIN DIRECT: < 0.2 MG/DL (ref 0–0.3)
CHOLESTEROL, FASTING: 141 MG/DL (ref 100–199)
HDLC SERPL-MCNC: 43 MG/DL
LDL CHOLESTEROL CALCULATED: 73 MG/DL
TOTAL PROTEIN: 7 G/DL (ref 6.1–8)
TRIGLYCERIDE, FASTING: 124 MG/DL (ref 0–199)

## 2019-07-31 PROCEDURE — 90471 IMMUNIZATION ADMIN: CPT | Performed by: NURSE PRACTITIONER

## 2019-07-31 PROCEDURE — 99213 OFFICE O/P EST LOW 20 MIN: CPT | Performed by: NURSE PRACTITIONER

## 2019-07-31 PROCEDURE — 90732 PPSV23 VACC 2 YRS+ SUBQ/IM: CPT | Performed by: NURSE PRACTITIONER

## 2019-07-31 NOTE — PROGRESS NOTES
Chief Complaint   Patient presents with    Follow-up     Pt presents for a 3 month f/u for HTN and hyperlipidemia. Pt states she has been doing good. History obtained from chart review and the patient. SUBJECTIVE:  Clifton Lima is a 68 y.o. female that presents today for follow up chronic conditions. Dyslipidemia    Current Medication regimen - Pravastatin 80 mg  Tolerating medications well?  yes  Personal History of CAD? No   Hx of CAD in first degree relatives? Yes  Current smoker? No  History of HTN? Yes  History of DM? No  Goal LDL - < 100    Shortness of breath or chest pain? No  Neurologic changes? No  Extremity edema? No    Lab Results   Component Value Date    LDLCALC 121 04/30/2019     BP Readings from Last 3 Encounters:   07/31/19 128/88   06/19/19 (!) 149/80   04/30/19 138/86     Wt Readings from Last 3 Encounters:   07/31/19 236 lb 3.2 oz (107.1 kg)   06/19/19 233 lb 9.6 oz (106 kg)   04/30/19 235 lb 12.8 oz (107 kg)     HTN    Does patient check BP regularly at home? - Yes - 170 systolic, [id number]  Current Medication regimen - Lopressor, Procardia, Hydralazine. Tolerating medications well? - yes    Shortness of breath or chest pain? No  Headache or visual complaints? No  Neurologic changes like confusion? No  Extremity edema?  No    BP Readings from Last 3 Encounters:   07/31/19 128/88   06/19/19 (!) 149/80   04/30/19 138/86       Age/Gender Health Maintenance    Lipid -  Lab Results   Component Value Date    CHOL 207 (H) 03/08/2018    CHOL 176 08/17/2016     Lab Results   Component Value Date    TRIG 88 03/08/2018    TRIG 112 08/17/2016     Lab Results   Component Value Date    HDL 43 04/30/2019    HDL 45 03/08/2018    HDL 40 08/17/2016     Lab Results   Component Value Date    LDLCALC 121 04/30/2019    LDLCALC 144 (H) 03/08/2018    LDLCALC 114 08/17/2016     Lab Results   Component Value Date    LABVLDL 18 03/08/2018    LABVLDL 22 08/17/2016     Lab Results   Component Value Dyslipidemia       Past Medical History:   Diagnosis Date    Acquired hypothyroidism 8/30/2016    Cancer [facility])     cervical       Past Surgical History:   Procedure Laterality Date    KNEE SURGERY      ORTHOPEDIC SURGERY      TONSILLECTOMY         Allergies   Allergen Reactions    Atarax [Hydroxyzine] Nausea And Vomiting       Social History     Socioeconomic History    Marital status:      Spouse name: Not on file    Number of children: Not on file    Years of education: Not on file    Highest education level: Not on file   Occupational History    Not on file   Social Needs    Financial resource strain: Not on file    Food insecurity:     Worry: Not on file     Inability: Not on file    Transportation needs:     Medical: Not on file     Non-medical: Not on file   Tobacco Use    Smoking status: Former Smoker     Packs/day: 0.50     Years: 30.00     Pack years: 15.00     Last attempt to quit: 1/12/2016     Years since quitting: 3.5    Smokeless tobacco: Never Used   Substance and Sexual Activity    Alcohol use: No    Drug use: No    Sexual activity: Not on file   Lifestyle    Physical activity:     Days per week: Not on file     Minutes per session: Not on file    Stress: Not on file   Relationships    Social connections:     Talks on phone: Not on file     Gets together: Not on file     Attends Spiritism service: Not on file     Active member of club or organization: Not on file     Attends meetings of clubs or organizations: Not on file     Relationship status: Not on file    Intimate partner violence:     Fear of current or ex partner: Not on file     Emotionally abused: Not on file     Physically abused: Not on file     Forced sexual activity: Not on file   Other Topics Concern    Not on file   Social History Narrative    Not on file       Family History   Problem Relation Age of Onset    Depression Mother     Diabetes Mother     Heart Disease Mother     Cancer Father    Hernandez is managing her HTN  - con't hydralazine, Lopressor and Procardia  - con't Pravastatin for cholesterol, recheck LFT and fasting lipid panel today  - update pneumococcal immunization  - she declines mammogram and colorectal cancer screenings      DISPOSITION    Return in about 6 months (around 1/31/2020) for chronic conditions. Stacey Rucker released without restrictions. PATIENT COUNSELING    Counseling was provided today regarding the following topics: Healthy eating habits, Regular exercise, substance abuse and healthy sleep habits. Stacey Rucker received counseling on the following healthy behaviors: medication adherence    Patient given educational materials on: See Attached    I have instructed Stacey Rucker to complete a self tracking handout on Blood Pressures  and instructed them to bring it with them to her next appointment. Barriers to learning and self management: none    Discussed use, benefit, and side effects of prescribed medications. Barriers to medication compliance addressed. All patient questions answered. Pt voiced understanding.        Electronically signed by MARQUEZ Dodge CNP on 7/31/2019 at 9:11 AM

## 2019-07-31 NOTE — TELEPHONE ENCOUNTER
Pt notified. Verbalized an understanding. Encouraged pt to call office back with any further questions.

## 2019-10-24 DIAGNOSIS — E03.9 ACQUIRED HYPOTHYROIDISM: ICD-10-CM

## 2019-10-24 RX ORDER — LEVOTHYROXINE SODIUM 0.07 MG/1
TABLET ORAL
Qty: 90 TABLET | Refills: 0 | Status: SHIPPED | OUTPATIENT
Start: 2019-10-24 | End: 2020-01-31

## 2020-01-01 ENCOUNTER — OFFICE VISIT (OUTPATIENT)
Dept: FAMILY MEDICINE CLINIC | Age: 70
End: 2020-01-01
Payer: COMMERCIAL

## 2020-01-01 ENCOUNTER — OFFICE VISIT (OUTPATIENT)
Dept: NEPHROLOGY | Age: 70
End: 2020-01-01
Payer: COMMERCIAL

## 2020-01-01 ENCOUNTER — NURSE ONLY (OUTPATIENT)
Dept: LAB | Age: 70
End: 2020-01-01

## 2020-01-01 VITALS
HEIGHT: 61 IN | OXYGEN SATURATION: 97 % | WEIGHT: 234.2 LBS | HEART RATE: 58 BPM | TEMPERATURE: 97.4 F | SYSTOLIC BLOOD PRESSURE: 146 MMHG | DIASTOLIC BLOOD PRESSURE: 86 MMHG | RESPIRATION RATE: 16 BRPM | BODY MASS INDEX: 44.22 KG/M2

## 2020-01-01 VITALS
HEIGHT: 63 IN | DIASTOLIC BLOOD PRESSURE: 89 MMHG | TEMPERATURE: 98.2 F | WEIGHT: 231 LBS | SYSTOLIC BLOOD PRESSURE: 178 MMHG | HEART RATE: 70 BPM | BODY MASS INDEX: 40.93 KG/M2 | OXYGEN SATURATION: 98 %

## 2020-01-01 LAB
ANION GAP SERPL CALCULATED.3IONS-SCNC: 12 MEQ/L (ref 8–16)
BUN BLDV-MCNC: 24 MG/DL (ref 7–22)
CALCIUM SERPL-MCNC: 9.2 MG/DL (ref 8.5–10.5)
CHLORIDE BLD-SCNC: 111 MEQ/L (ref 98–111)
CO2: 24 MEQ/L (ref 23–33)
CREAT SERPL-MCNC: 2.1 MG/DL (ref 0.4–1.2)
GFR SERPL CREATININE-BSD FRML MDRD: 23 ML/MIN/1.73M2
GLUCOSE BLD-MCNC: 104 MG/DL (ref 70–108)
PHOSPHORUS: 3 MG/DL (ref 2.4–4.7)
POTASSIUM SERPL-SCNC: 4.4 MEQ/L (ref 3.5–5.2)
PTH INTACT: 79.8 PG/ML (ref 15–65)
SODIUM BLD-SCNC: 147 MEQ/L (ref 135–145)
VITAMIN D 25-HYDROXY: 32 NG/ML (ref 30–100)

## 2020-01-01 PROCEDURE — 99213 OFFICE O/P EST LOW 20 MIN: CPT | Performed by: INTERNAL MEDICINE

## 2020-01-01 PROCEDURE — 99214 OFFICE O/P EST MOD 30 MIN: CPT | Performed by: NURSE PRACTITIONER

## 2020-01-01 RX ORDER — NIFEDIPINE 60 MG/1
TABLET, FILM COATED, EXTENDED RELEASE ORAL
Qty: 180 TABLET | Refills: 3 | Status: SHIPPED | OUTPATIENT
Start: 2020-01-01

## 2020-01-01 RX ORDER — CALCITRIOL 0.25 UG/1
0.25 CAPSULE, LIQUID FILLED ORAL DAILY
Qty: 90 CAPSULE | Refills: 5 | Status: CANCELLED | OUTPATIENT
Start: 2020-01-01

## 2020-01-01 RX ORDER — PRAVASTATIN SODIUM 80 MG/1
TABLET ORAL
Qty: 90 TABLET | Refills: 1 | OUTPATIENT
Start: 2020-01-01

## 2020-01-01 RX ORDER — PRAVASTATIN SODIUM 80 MG/1
TABLET ORAL
Qty: 90 TABLET | Refills: 3 | Status: SHIPPED | OUTPATIENT
Start: 2020-01-01

## 2020-01-01 RX ORDER — METOPROLOL TARTRATE 50 MG/1
TABLET, FILM COATED ORAL
Qty: 180 TABLET | Refills: 3 | Status: SHIPPED | OUTPATIENT
Start: 2020-01-01

## 2020-01-01 RX ORDER — METOPROLOL TARTRATE 50 MG/1
TABLET, FILM COATED ORAL
Qty: 180 TABLET | Refills: 1 | OUTPATIENT
Start: 2020-01-01

## 2020-01-01 RX ORDER — HYDRALAZINE HYDROCHLORIDE 50 MG/1
TABLET, FILM COATED ORAL
Qty: 270 TABLET | Refills: 3 | Status: SHIPPED | OUTPATIENT
Start: 2020-01-01

## 2020-01-01 RX ORDER — LEVOTHYROXINE SODIUM 0.07 MG/1
TABLET ORAL
Qty: 90 TABLET | Refills: 3 | Status: SHIPPED | OUTPATIENT
Start: 2020-01-01

## 2020-01-01 RX ORDER — ERGOCALCIFEROL 1.25 MG/1
50000 CAPSULE ORAL WEEKLY
Qty: 36 CAPSULE | Refills: 5 | Status: SHIPPED | OUTPATIENT
Start: 2020-01-01

## 2020-01-01 ASSESSMENT — PATIENT HEALTH QUESTIONNAIRE - PHQ9
SUM OF ALL RESPONSES TO PHQ9 QUESTIONS 1 & 2: 0
SUM OF ALL RESPONSES TO PHQ QUESTIONS 1-9: 0
1. LITTLE INTEREST OR PLEASURE IN DOING THINGS: 0
SUM OF ALL RESPONSES TO PHQ QUESTIONS 1-9: 0
2. FEELING DOWN, DEPRESSED OR HOPELESS: 0
SUM OF ALL RESPONSES TO PHQ QUESTIONS 1-9: 0

## 2020-01-31 RX ORDER — LEVOTHYROXINE SODIUM 0.07 MG/1
TABLET ORAL
Qty: 90 TABLET | Refills: 3 | Status: SHIPPED | OUTPATIENT
Start: 2020-01-31 | End: 2020-01-01 | Stop reason: SDUPTHER

## 2020-03-13 RX ORDER — NIFEDIPINE 60 MG/1
TABLET, FILM COATED, EXTENDED RELEASE ORAL
Qty: 180 TABLET | Refills: 3 | Status: SHIPPED | OUTPATIENT
Start: 2020-03-13 | End: 2020-01-01 | Stop reason: SDUPTHER

## 2020-04-25 RX ORDER — METOPROLOL TARTRATE 50 MG/1
TABLET, FILM COATED ORAL
Qty: 180 TABLET | Refills: 1 | Status: SHIPPED | OUTPATIENT
Start: 2020-04-25 | End: 2020-01-01 | Stop reason: SDUPTHER

## 2020-04-25 RX ORDER — PRAVASTATIN SODIUM 80 MG/1
TABLET ORAL
Qty: 90 TABLET | Refills: 1 | Status: SHIPPED | OUTPATIENT
Start: 2020-04-25 | End: 2020-01-01 | Stop reason: SDUPTHER

## 2020-07-07 ENCOUNTER — TELEPHONE (OUTPATIENT)
Dept: NEPHROLOGY | Age: 70
End: 2020-07-07

## 2020-09-16 NOTE — PROGRESS NOTES
231 lb (104.8 kg)   SpO2 98%   BMI 40.92 kg/m²  Wt Readings from Last 3 Encounters:   09/16/20 231 lb (104.8 kg)   07/31/19 236 lb 3.2 oz (107.1 kg)   06/19/19 233 lb 9.6 oz (106 kg)        Physical Exam     General -- well developed and well nourished  Lungs -- clear  Heart -- S1, S2 heard, JVD - no  Abdomen - soft, non-tender  Extremities -- no edema  CNS - awake and alert    Labs, Radiology and Tests    Labs -    8/16 9/16 2/17 6/17 12/17 6/18 6/19 9/20    Potassium 3.7 4.0 3.8 4.1 4.5 5.0 4.3 4.4    BUN 21 25 22 27 23 21 18 24    Creatinine 2.2 1.9 2.1 2.1 2.0 2.0 2.0 2.1    eGFR 22 26 23 23 25 25 25 23    ca 9.2 9.0 9.2 9.1 9.1 9.1 9.2 9.2    iPTH 92 89 52   165  78                UPCR  12/54 51/207         [facility]                          6/18 - phosphorus 3.4, hemoglobin 14.5 vitamin D 20     Renal Ultrasound Scan -- 8/2016  Right kidney 10.0 cm and left kidney 9.1 cm  Normal echogenicity and no masses no hydronephrosis  No evidence of renal artery stenosis     Assessment      1. Renal  -- CKD stage 4 most likely secondary to long standing uncontrolled hypertension and senile Nephrosclerosis    - Renal function overall stable within the last 36 months. Her GFR is around 23 ML per minute minute. - Volume status stable . Mild fluctuations   2. Essential Hypertension -- running well at home. Currently high in office but well at home though  3. Primary hypothyroidism- on levothyroxine. 4. Secondary hyperparathyroidism most likely renal origin. On calcitriol and doing well. 5. Avoid Nephrotoxins, Nonsteroidal anti-inflammatories and IV Contrast Dye   6. Follow up in 12 months. Bmp in 6 months     Tests and orders placed this Encounter     No orders of the defined types were placed in this encounter. Mayito Brown M.D  [facility].

## 2020-10-28 NOTE — TELEPHONE ENCOUNTER
LMOM requesting pt to call back at earliest convenience. pt needs appt prior to getting refils      Medications     Refused        Disp  Refills  Start  End    pravastatin (PRAVACHOL) 80 MG tablet [Pharmacy Med Name: PRAVASTATIN SODIUM 80MG TABS]  90 tablet  1  10/28/2020     Sig:  TAKE 1 TABLET BY MOUTH ONCE DAILY. Class:  Normal    LIT:  No    Reason for Refusal:  Patient needs an appointment    Refused By:  MARQUEZ Roberts CNP    metoprolol tartrate (LOPRESSOR) 50 MG tablet [Pharmacy Med Name: METOPROLOL TARTRATE 50MG TABS]  180 tablet  1  10/28/2020     Sig:  TAKE 1 TABLET BY MOUTH 2 TIMES DAILY.     Class:  Normal    LIT:  No    Reason for Refusal:  Patient needs an appointment    Refused By:  MARQUEZ Roberts CNP

## 2020-10-28 NOTE — TELEPHONE ENCOUNTER
Future Appointments   Date Time Provider Litzy Frederick   9/15/2021  1:00 PM Kobi Vance MD [facility] [place]     Recent Visits  Date Type Provider Dept   07/31/19 Office Visit MARQUEZ [place] - CNP [facility]   Showing recent visits within past 540 days with a meds authorizing provider and meeting all other requirements     Future Appointments  No visits were found meeting these conditions.    Showing future appointments within next 150 days with a meds authorizing provider and meeting all other requirements

## 2020-10-29 NOTE — TELEPHONE ENCOUNTER
Future Appointments   Date Time Provider Litzy Yoly   11/4/2020 10:00 AM Benson Blanchard, APRN - [id number] [place]   9/15/2021  1:00 PM MD [facility] [place]

## 2020-11-04 NOTE — PROGRESS NOTES
Chief Complaint   Patient presents with    1 Year Follow Up     no concerns       History obtained from chart review and the patient. SUBJECTIVE:  Samia Faust is a 65 y.o. female that presents today for follow up chronic conditions. Dyslipidemia    Current Medication regimen - Pravastatin 80 mg  Tolerating medications well?  yes  Personal History of CAD? No   Hx of CAD in first degree relatives? Yes  Current smoker? No  History of HTN? Yes  History of DM? No  Goal LDL - < 100    Shortness of breath or chest pain? No  Neurologic changes? No  Extremity edema? No    Lab Results   Component Value Date    LDLCALC 73 07/31/2019     BP Readings from Last 3 Encounters:   11/04/20 (!) 146/86   09/16/20 (!) 178/89 07/31/19 128/88     Wt Readings from Last 3 Encounters:   11/04/20 234 lb 3.2 oz (106.2 kg)   09/16/20 231 lb (104.8 kg)   07/31/19 236 lb 3.2 oz (107.1 kg)     HTN    Does patient check BP regularly at home? - Yes - 130's  Current Medication regimen - Lopressor, Procardia, Hydralazine. Tolerating medications well? - yes    Shortness of breath or chest pain? No  Headache or visual complaints? No  Neurologic changes like confusion? No  Extremity edema? No    BP Readings from Last 3 Encounters:   11/04/20 (!) 146/86   09/16/20 (!) 178/89   07/31/19 128/88     Hypothyroidism    HPI:  Currently treated for Hypothyroidism? Yes  Fatigue? No  Recent change in weight? No  Cold/Heat intolerance? No  Diarrhea/Constipation? No  Diaphoresis? No  Anxiety? Yes - some anxiety  Palpitations? No   Hair Loss?   Yes - has been stable      Age/Gender Health Maintenance    Lipid -  Lab Results   Component Value Date    CHOL 207 (H) 03/08/2018    CHOL 176 08/17/2016     Lab Results   Component Value Date    TRIG 88 03/08/2018    TRIG 112 08/17/2016     Lab Results   Component Value Date    HDL 43 07/31/2019    HDL 43 04/30/2019    HDL 45 03/08/2018     Lab Results   Component Value Date    LDLCALC 73 Dispense:  270 tablet     Refill:  3    NIFEdipine (ADALAT CC) 60 MG extended release tablet     Sig: TAKE 1 TABLET BY MOUTH 2 TIMES A DAY     Dispense:  180 tablet     Refill:  3    metoprolol tartrate (LOPRESSOR) 50 MG tablet     Sig: TAKE 1 TABLET BY MOUTH 2 TIMES A DAY     Dispense:  180 tablet     Refill:  3    pravastatin (PRAVACHOL) 80 MG tablet     Sig: TAKE 1 TABLET BY MOUTH ONE TIME A DAY     Dispense:  90 tablet     Refill:  3         All medications reviewed and reconciled, including OTC and herbal medications. Updated list given to patient.        Patient Active Problem List   Diagnosis    CKD (chronic kidney disease), stage IV (HCC)    Essential hypertension    [place]IN (generalized anxiety disorder)    Acquired hypothyroidism    Secondary hyperparathyroidism of renal origin ([facility] [address]    Dyslipidemia       Past Medical History:   Diagnosis Date    Acquired hypothyroidism 2016    Cancer ([facility]     cervical       Past Surgical History:   Procedure Laterality Date    KNEE SURGERY      ORTHOPEDIC SURGERY      TONSILLECTOMY         Allergies   Allergen Reactions    Atarax [Hydroxyzine] Nausea And Vomiting       Social History     Socioeconomic History    Marital status:      Spouse name: Not on file    Number of children: Not on file    Years of education: Not on file    Highest education level: Not on file   Occupational History    Not on file   Social Needs    Financial resource strain: Not on file    Food insecurity     Worry: Not on file     Inability: Not on file    Transportation needs     Medical: Not on file     Non-medical: Not on file   Tobacco Use    Smoking status: Former Smoker     Packs/day: 0.50     Years: 30.00     Pack years: 15.00     Last attempt to quit: 2016     Years since quittin.8    Smokeless tobacco: Never Used   Substance and Sexual Activity    Alcohol use: No    Drug use: No    Sexual activity: Not on file   Lifestyle    Physical activity     Days per week: Not on file     Minutes per session: Not on file    Stress: Not on file   Relationships    Social connections     Talks on phone: Not on file     Gets together: Not on file     Attends Jainism service: Not on file     Active member of club or organization: Not on file     Attends meetings of clubs or organizations: Not on file     Relationship status: Not on file    Intimate partner violence     Fear of current or ex partner: Not on file     Emotionally abused: Not on file     Physically abused: Not on file     Forced sexual activity: Not on file   Other Topics Concern    Not on file   Social History Narrative    Not on file       Family History   Problem Relation Age of Onset    Depression Mother     Diabetes Mother     Heart Disease Mother     Cancer Father     High Blood Pressure Father     Stroke Father          I have reviewed the patient's past medical history, past surgical history, allergies, medications, social and family history and I have made updates where appropriate.       Review of Systems  Positive responses are highlighted in bold    Constitutional:  Fever, Chills, Night Sweats, Fatigue, Unexpected changes in weight  Eyes:  Eye discharge, Eye pain, Eye redness, Visual disturbances   HENT:  Ear pain, Tinnitus, Nosebleeds, Trouble swallowing, Hearing loss, Sore throat  Cardiovascular:  Chest Pain, Palpitations, Orthopnea, Paroxysmal Nocturnal Dyspnea  Respiratory:  Cough, Wheezing, Shortness of breath, Chest tightness, Apnea  Gastrointestinal:  Nausea, Vomiting, Diarrhea, Constipation, Heartburn, Blood in stool  Genitourinary:  Difficulty or painful urination, Flank pain, Change in frequency, Urgency  Skin:  Color change, Rash, Itching, Wound  Psychiatric:  Hallucinations, Anxiety, Depression, Suicidal ideation  Hematological:  Enlarged glands, Easy bleeding, Easily bruising  Musculoskeletal:  Joint pain, Back pain, Gait problems, Joint swelling, Myalgias  Neurological:  Dizziness, Headaches, Presyncope, Numbness, Seizures, Tremors  Allergy:  Environmental allergies, Food allergies  Endocrine:  Heat Intolerance, Cold Intolerance, Polydipsia, Polyphagia, Polyuria      Lab Results   Component Value Date     (H) 09/11/2020    K 4.4 09/11/2020     09/11/2020    CO2 24 09/11/2020    BUN 24 (H) 09/11/2020    CREATININE 2.1 (H) 09/11/2020    GLUCOSE 104 09/11/2020    CALCIUM 9.2 09/11/2020    PROT 7.0 07/31/2019    LABALBU 3.9 07/31/2019    BILITOT <0.2 (L) 07/31/2019    ALKPHOS 160 (H) 07/31/2019    AST 16 07/31/2019    ALT 13 07/31/2019    LABGLOM 23 (A) 09/11/2020       Lab Results   Component Value Date    WBC 5.5 04/30/2019    HGB 13.5 04/30/2019    HCT 42.4 04/30/2019    .7 (H) 04/30/2019     04/30/2019     Lab Results   Component Value Date     (H) 06/06/2018    CALCIUM 9.2 09/11/2020    PHOS 3.0 09/11/2020     Lab Results   Component Value Date    TSH 4.090 04/30/2019       PHYSICAL EXAM:  Vitals:    11/04/20 1006 11/04/20 1018   BP: (!) 155/72 (!) 146/86   Pulse: 58    Resp: 16    Temp: 97.4 °F (36.3 °C)    TempSrc: Temporal    SpO2: 97%    Weight: 234 lb 3.2 oz (106.2 kg)    Height: 5' 1\" (1.549 m)      Body mass index is 44.25 kg/m². VS Reviewed  General Appearance: A&O x 3, No acute distress,well developed and well- nourished  Head: normocephalic and atraumatic  Eyes: pupils equal, round, and reactive to light, extraocular eye movements intact, conjunctivae and eye lids without erythema  Neck: supple and non-tender without mass, no thyromegaly or thyroid nodules, no cervical lymphadenopathy  Pulmonary/Chest: clear to auscultation bilaterally- no wheezes, rales or rhonchi, normal air movement, no respiratory distress or retractions  Cardiovascular: S1 and S2 auscultated w/ RRR. Abdomen: soft, non-tender, non-distended, bowl sounds physiologic,  no rebound or guarding, no masses or hernias noted.  Liver and spleen without enlargement. Extremities: no cyanosis, clubbing or edema of the lower extremities. Musculoskeletal: No joint swelling or gross deformity   Neuro:  Alert, 5/5 strength globally and symmetrically  Psych: Affect appropriate. Mood normal. Thought process is normal without evidence of depression or psychosis. Good insight and appropriate interaction. Cognition and memory appear to be intact. Skin: warm and dry, no rash or erythema  Lymph:  No cervical, auricular or supraclavicular lymph nodes palpated    ASSESSMENT & PLAN  Andreas Metcalf was seen today for 1 year follow up. Diagnoses and all orders for this visit:    Acquired hypothyroidism  -     levothyroxine (SYNTHROID) 75 MCG tablet; TAKE 1 TABLET BY MOUTH ONCE DAILY. -     TSH With Reflex Ft4; Future    Uncontrolled hypertension    Essential hypertension  -     hydrALAZINE (APRESOLINE) 50 MG tablet; TAKE 1 TABLET BY MOUTH 3 TIMES A DAY  -     NIFEdipine (ADALAT CC) 60 MG extended release tablet; TAKE 1 TABLET BY MOUTH 2 TIMES A DAY  -     metoprolol tartrate (LOPRESSOR) 50 MG tablet; TAKE 1 TABLET BY MOUTH 2 TIMES A DAY  -     CBC With Auto Differential; Future  -     Comprehensive Metabolic Panel; Future  -     Lipid, Fasting; Future    Dyslipidemia  -     pravastatin (PRAVACHOL) 80 MG tablet; TAKE 1 TABLET BY MOUTH ONE TIME A DAY  -     CBC With Auto Differential; Future  -     Lipid, Fasting; Future      - BP not quite a goal. con't Adalat, Lopressor. Increase Hydralazine to 50 mg tid  - con't Pravastatin  - con't Synthroid 75 mcg  - annual labs  - declines flu shot, DEXA scan, colonoscopy and mammogram  - follows also with nephrology for CKD stage 4 and they are also managing HTN    DISPOSITION    Return in about 4 weeks (around 12/2/2020) for HTN. Andreas Metcalf released without restrictions.       PATIENT COUNSELING    Counseling was provided today regarding the following topics: Healthy eating habits, Regular exercise, substance abuse and healthy sleep habits. Radha Blanco received counseling on the following healthy behaviors: medication adherence    Patient given educational materials on: See Attached    I have instructed Radha Blanco to complete a self tracking handout on Blood Pressures  and instructed them to bring it with them to her next appointment. Barriers to learning and self management: none    Discussed use, benefit, and side effects of prescribed medications. Barriers to medication compliance addressed. All patient questions answered. Pt voiced understanding.        Electronically signed by MARQUEZ Turcios CNP on 11/4/2020 at 10:20 AM

## 2021-01-01 ENCOUNTER — TELEPHONE (OUTPATIENT)
Dept: FAMILY MEDICINE CLINIC | Age: 71
End: 2021-01-01

## 2021-01-01 ENCOUNTER — HOSPITAL ENCOUNTER (EMERGENCY)
Age: 71
End: 2021-07-16
Attending: EMERGENCY MEDICINE
Payer: COMMERCIAL

## 2021-01-01 ENCOUNTER — HOSPITAL ENCOUNTER (OUTPATIENT)
Dept: OCCUPATIONAL THERAPY | Age: 71
Setting detail: THERAPIES SERIES
Discharge: HOME | End: 2021-07-07
Payer: COMMERCIAL

## 2021-01-01 ENCOUNTER — OFFICE VISIT (OUTPATIENT)
Dept: FAMILY MEDICINE CLINIC | Age: 71
End: 2021-01-01
Payer: COMMERCIAL

## 2021-01-01 ENCOUNTER — NURSE ONLY (OUTPATIENT)
Dept: LAB | Age: 71
End: 2021-01-01

## 2021-01-01 ENCOUNTER — PROCEDURE VISIT (OUTPATIENT)
Dept: NEUROLOGY | Age: 71
End: 2021-01-01
Payer: COMMERCIAL

## 2021-01-01 ENCOUNTER — HOSPITAL ENCOUNTER (OUTPATIENT)
Dept: GENERAL RADIOLOGY | Age: 71
Discharge: HOME | End: 2021-05-12
Payer: COMMERCIAL

## 2021-01-01 ENCOUNTER — HOSPITAL ENCOUNTER (OUTPATIENT)
Dept: OCCUPATIONAL THERAPY | Age: 71
Setting detail: THERAPIES SERIES
Discharge: HOME | End: 2021-07-14
Payer: COMMERCIAL

## 2021-01-01 ENCOUNTER — HOSPITAL ENCOUNTER (OUTPATIENT)
Dept: OCCUPATIONAL THERAPY | Age: 71
Setting detail: THERAPIES SERIES
Discharge: HOME | End: 2021-07-12
Payer: COMMERCIAL

## 2021-01-01 ENCOUNTER — HOSPITAL ENCOUNTER (OUTPATIENT)
Age: 71
Discharge: HOME | End: 2021-05-12
Payer: COMMERCIAL

## 2021-01-01 ENCOUNTER — HOSPITAL ENCOUNTER (OUTPATIENT)
Dept: OCCUPATIONAL THERAPY | Age: 71
Setting detail: THERAPIES SERIES
Discharge: HOME | End: 2021-06-30
Payer: COMMERCIAL

## 2021-01-01 ENCOUNTER — HOSPITAL ENCOUNTER (OUTPATIENT)
Dept: WOMENS IMAGING | Age: 71
Discharge: HOME | End: 2021-05-25
Payer: COMMERCIAL

## 2021-01-01 VITALS
DIASTOLIC BLOOD PRESSURE: 72 MMHG | TEMPERATURE: 97.3 F | SYSTOLIC BLOOD PRESSURE: 136 MMHG | RESPIRATION RATE: 18 BRPM | OXYGEN SATURATION: 97 % | BODY MASS INDEX: 44.22 KG/M2 | HEART RATE: 66 BPM | WEIGHT: 234.2 LBS | HEIGHT: 61 IN

## 2021-01-01 DIAGNOSIS — M79.642 LEFT HAND PAIN: ICD-10-CM

## 2021-01-01 DIAGNOSIS — G56.02 LEFT CARPAL TUNNEL SYNDROME: ICD-10-CM

## 2021-01-01 DIAGNOSIS — E03.9 ACQUIRED HYPOTHYROIDISM: ICD-10-CM

## 2021-01-01 DIAGNOSIS — D53.9 MACROCYTIC ANEMIA: Primary | ICD-10-CM

## 2021-01-01 DIAGNOSIS — G89.29 CHRONIC LEFT SHOULDER PAIN: ICD-10-CM

## 2021-01-01 DIAGNOSIS — M25.512 CHRONIC LEFT SHOULDER PAIN: ICD-10-CM

## 2021-01-01 DIAGNOSIS — I10 ESSENTIAL HYPERTENSION: ICD-10-CM

## 2021-01-01 DIAGNOSIS — I46.9 CARDIOPULMONARY ARREST (HCC): Primary | ICD-10-CM

## 2021-01-01 DIAGNOSIS — M54.12 CERVICAL RADICULOPATHY: ICD-10-CM

## 2021-01-01 DIAGNOSIS — I10 ESSENTIAL HYPERTENSION: Primary | ICD-10-CM

## 2021-01-01 DIAGNOSIS — M79.602 LEFT ARM PAIN: Primary | ICD-10-CM

## 2021-01-01 DIAGNOSIS — E78.5 DYSLIPIDEMIA: ICD-10-CM

## 2021-01-01 DIAGNOSIS — R22.32 LOCALIZED SWELLING ON LEFT HAND: ICD-10-CM

## 2021-01-01 DIAGNOSIS — M81.0 POST-MENOPAUSAL OSTEOPOROSIS: Primary | ICD-10-CM

## 2021-01-01 DIAGNOSIS — M81.0 POST-MENOPAUSAL OSTEOPOROSIS: ICD-10-CM

## 2021-01-01 DIAGNOSIS — M79.642 LEFT HAND PAIN: Primary | ICD-10-CM

## 2021-01-01 LAB
ALBUMIN SERPL-MCNC: 3.5 G/DL (ref 3.5–5.1)
ALP BLD-CCNC: 111 U/L (ref 38–126)
ALT SERPL-CCNC: 16 U/L (ref 11–66)
ANION GAP SERPL CALCULATED.3IONS-SCNC: 12 MEQ/L (ref 8–16)
AST SERPL-CCNC: 15 U/L (ref 5–40)
BASOPHILS # BLD: 0.5 %
BASOPHILS ABSOLUTE: 0 THOU/MM3 (ref 0–0.1)
BILIRUB SERPL-MCNC: 0.5 MG/DL (ref 0.3–1.2)
BUN BLDV-MCNC: 24 MG/DL (ref 7–22)
CALCIUM SERPL-MCNC: 9.2 MG/DL (ref 8.5–10.5)
CHLORIDE BLD-SCNC: 107 MEQ/L (ref 98–111)
CHOLESTEROL, FASTING: 135 MG/DL (ref 100–199)
CO2: 25 MEQ/L (ref 23–33)
CREAT SERPL-MCNC: 2.4 MG/DL (ref 0.4–1.2)
EOSINOPHIL # BLD: 1.8 %
EOSINOPHILS ABSOLUTE: 0.2 THOU/MM3 (ref 0–0.4)
ERYTHROCYTE [DISTWIDTH] IN BLOOD BY AUTOMATED COUNT: 13.9 % (ref 11.5–14.5)
ERYTHROCYTE [DISTWIDTH] IN BLOOD BY AUTOMATED COUNT: 53 FL (ref 35–45)
GFR SERPL CREATININE-BSD FRML MDRD: 20 ML/MIN/1.73M2
GLUCOSE BLD-MCNC: 109 MG/DL (ref 70–108)
HCT VFR BLD CALC: 37.2 % (ref 37–47)
HDLC SERPL-MCNC: 45 MG/DL
HEMOGLOBIN: 11.3 GM/DL (ref 12–16)
IMMATURE GRANS (ABS): 0.03 THOU/MM3 (ref 0–0.07)
IMMATURE GRANULOCYTES: 0.3 %
LDL CHOLESTEROL CALCULATED: 73 MG/DL
LYMPHOCYTES # BLD: 12.6 %
LYMPHOCYTES ABSOLUTE: 1.1 THOU/MM3 (ref 1–4.8)
MCH RBC QN AUTO: 32.1 PG (ref 26–33)
MCHC RBC AUTO-ENTMCNC: 30.4 GM/DL (ref 32.2–35.5)
MCV RBC AUTO: 105.7 FL (ref 81–99)
MONOCYTES # BLD: 8.2 %
MONOCYTES ABSOLUTE: 0.7 THOU/MM3 (ref 0.4–1.3)
NUCLEATED RED BLOOD CELLS: 0 /100 WBC
PLATELET # BLD: 191 THOU/MM3 (ref 130–400)
PMV BLD AUTO: 12.5 FL (ref 9.4–12.4)
POTASSIUM SERPL-SCNC: 4.2 MEQ/L (ref 3.5–5.2)
RBC # BLD: 3.52 MILL/MM3 (ref 4.2–5.4)
SEG NEUTROPHILS: 76.6 %
SEGMENTED NEUTROPHILS ABSOLUTE COUNT: 6.7 THOU/MM3 (ref 1.8–7.7)
SODIUM BLD-SCNC: 144 MEQ/L (ref 135–145)
T4 FREE: 1.23 NG/DL (ref 0.93–1.76)
TOTAL PROTEIN: 6.7 G/DL (ref 6.1–8)
TRIGLYCERIDE, FASTING: 84 MG/DL (ref 0–199)
TSH SERPL DL<=0.05 MIU/L-ACNC: 6.25 UIU/ML (ref 0.4–4.2)
WBC # BLD: 8.7 THOU/MM3 (ref 4.8–10.8)

## 2021-01-01 PROCEDURE — 97022 WHIRLPOOL THERAPY: CPT

## 2021-01-01 PROCEDURE — 97165 OT EVAL LOW COMPLEX 30 MIN: CPT

## 2021-01-01 PROCEDURE — 97110 THERAPEUTIC EXERCISES: CPT

## 2021-01-01 PROCEDURE — 99291 CRITICAL CARE FIRST HOUR: CPT | Performed by: INTERNAL MEDICINE

## 2021-01-01 PROCEDURE — 6360000002 HC RX W HCPCS: Performed by: INTERNAL MEDICINE

## 2021-01-01 PROCEDURE — 96366 THER/PROPH/DIAG IV INF ADDON: CPT

## 2021-01-01 PROCEDURE — 92950 HEART/LUNG RESUSCITATION CPR: CPT

## 2021-01-01 PROCEDURE — 31500 INSERT EMERGENCY AIRWAY: CPT | Performed by: INTERNAL MEDICINE

## 2021-01-01 PROCEDURE — 95909 NRV CNDJ TST 5-6 STUDIES: CPT | Performed by: PSYCHIATRY & NEUROLOGY

## 2021-01-01 PROCEDURE — 2580000003 HC RX 258: Performed by: INTERNAL MEDICINE

## 2021-01-01 PROCEDURE — 96375 TX/PRO/DX INJ NEW DRUG ADDON: CPT

## 2021-01-01 PROCEDURE — 99291 CRITICAL CARE FIRST HOUR: CPT

## 2021-01-01 PROCEDURE — 96367 TX/PROPH/DG ADDL SEQ IV INF: CPT

## 2021-01-01 PROCEDURE — 2500000003 HC RX 250 WO HCPCS: Performed by: INTERNAL MEDICINE

## 2021-01-01 PROCEDURE — 97018 PARAFFIN BATH THERAPY: CPT

## 2021-01-01 PROCEDURE — 31500 INSERT EMERGENCY AIRWAY: CPT

## 2021-01-01 PROCEDURE — 36556 INSERT NON-TUNNEL CV CATH: CPT | Performed by: INTERNAL MEDICINE

## 2021-01-01 PROCEDURE — 95886 MUSC TEST DONE W/N TEST COMP: CPT | Performed by: PSYCHIATRY & NEUROLOGY

## 2021-01-01 PROCEDURE — 96365 THER/PROPH/DIAG IV INF INIT: CPT

## 2021-01-01 PROCEDURE — 31622 DX BRONCHOSCOPE/WASH: CPT | Performed by: INTERNAL MEDICINE

## 2021-01-01 PROCEDURE — 73130 X-RAY EXAM OF HAND: CPT

## 2021-01-01 PROCEDURE — 99214 OFFICE O/P EST MOD 30 MIN: CPT | Performed by: NURSE PRACTITIONER

## 2021-01-01 PROCEDURE — 77080 DXA BONE DENSITY AXIAL: CPT

## 2021-01-01 PROCEDURE — 99283 EMERGENCY DEPT VISIT LOW MDM: CPT

## 2021-01-01 RX ORDER — TRAMADOL HYDROCHLORIDE 50 MG/1
50 TABLET ORAL EVERY 12 HOURS PRN
Qty: 14 TABLET | Refills: 0 | Status: SHIPPED | OUTPATIENT
Start: 2021-01-01 | End: 2021-01-01 | Stop reason: SDUPTHER

## 2021-01-01 RX ORDER — AMIODARONE HYDROCHLORIDE 50 MG/ML
INJECTION, SOLUTION INTRAVENOUS DAILY PRN
Status: COMPLETED | OUTPATIENT
Start: 2021-01-01 | End: 2021-01-01

## 2021-01-01 RX ORDER — EPINEPHRINE 0.1 MG/ML
SYRINGE (ML) INJECTION DAILY PRN
Status: COMPLETED | OUTPATIENT
Start: 2021-01-01 | End: 2021-01-01

## 2021-01-01 RX ORDER — TRAMADOL HYDROCHLORIDE 50 MG/1
50 TABLET ORAL EVERY 12 HOURS PRN
Qty: 14 TABLET | Refills: 0 | Status: SHIPPED | OUTPATIENT
Start: 2021-01-01 | End: 2021-01-01

## 2021-01-01 RX ORDER — PREDNISONE 20 MG/1
40 TABLET ORAL DAILY
Qty: 10 TABLET | Refills: 0 | Status: SHIPPED | OUTPATIENT
Start: 2021-01-01 | End: 2021-01-01

## 2021-01-01 RX ORDER — CALCITRIOL 0.25 UG/1
0.25 CAPSULE, LIQUID FILLED ORAL DAILY
Qty: 90 CAPSULE | Refills: 3 | Status: SHIPPED | OUTPATIENT
Start: 2021-01-01

## 2021-01-01 RX ADMIN — Medication 1 MG: at 10:33

## 2021-01-01 RX ADMIN — AMIODARONE HYDROCHLORIDE 150 MG: 50 INJECTION, SOLUTION INTRAVENOUS at 10:55

## 2021-01-01 RX ADMIN — Medication 1 MG: at 10:40

## 2021-01-01 RX ADMIN — AMIODARONE HYDROCHLORIDE 150 MG: 50 INJECTION, SOLUTION INTRAVENOUS at 10:52

## 2021-01-01 RX ADMIN — Medication 3 MG: at 10:55

## 2021-01-01 RX ADMIN — Medication 50 MEQ: at 10:27

## 2021-01-01 RX ADMIN — Medication 1 MG: at 10:38

## 2021-01-01 RX ADMIN — Medication: at 10:33

## 2021-01-01 RX ADMIN — Medication 1 MG: at 10:49

## 2021-01-01 RX ADMIN — Medication 1 MG: at 10:29

## 2021-01-01 RX ADMIN — Medication 50 MEQ: at 10:50

## 2021-01-01 RX ADMIN — Medication 1 MG: at 10:45

## 2021-01-01 RX ADMIN — Medication 3 MCG/MIN: at 10:26

## 2021-01-01 RX ADMIN — Medication 1 MG: at 10:41

## 2021-01-01 ASSESSMENT — PATIENT HEALTH QUESTIONNAIRE - PHQ9
SUM OF ALL RESPONSES TO PHQ QUESTIONS 1-9: 2
1. LITTLE INTEREST OR PLEASURE IN DOING THINGS: 1
2. FEELING DOWN, DEPRESSED OR HOPELESS: 1

## 2021-05-12 NOTE — PROGRESS NOTES
Chief Complaint   Patient presents with    Arm Pain     edema in hand    Medication Check       History obtained from chart review and the patient. SUBJECTIVE:  Capo Mackey is a 65 y.o. female that presents today for follow up HTN    Does patient check BP regularly at home? - Yes - 130's  Current Medication regimen - Lopressor, Procardia, Hydralazine. Tolerating medications well? - yes    Shortness of breath or chest pain? No  Headache or visual complaints? No  Neurologic changes like confusion? No  Extremity edema? No    BP Readings from Last 3 Encounters:   05/12/21 136/72   11/04/20 (!) 146/86   09/16/20 (!) 178/89     Left hand pain  C/o left hand swelling for about 1 week. She has also been having pain in her shoulder and was unable to even raise her left arm. This is greatly improved with application of some heat to her left shoulder and hot shower. She is also having left hand pain x 1 week. No injury. She does also have some paresthesias in her left hand which come and go. Her left hand is also weak. She has taken some Motrin which helps with both the pain and swelling.     Age/Gender Health Maintenance    Lipid -  Lab Results   Component Value Date    CHOL 207 (H) 03/08/2018    CHOL 176 08/17/2016     Lab Results   Component Value Date    TRIG 88 03/08/2018    TRIG 112 08/17/2016     Lab Results   Component Value Date    HDL 43 07/31/2019    HDL 43 04/30/2019    HDL 45 03/08/2018     Lab Results   Component Value Date    LDLCALC 73 07/31/2019    LDLCALC 121 04/30/2019    LDLCALC 144 (H) 03/08/2018     Lab Results   Component Value Date    LABVLDL 18 03/08/2018    LABVLDL 22 08/17/2016     Lab Results   Component Value Date    CHOLHDLRATIO 4.6 03/08/2018    CHOLHDLRATIO 4.4 08/17/2016     DM Screen -   Lab Results   Component Value Date    LABA1C 5.1 04/30/2019     Colon Cancer Screening - declines  Lung Cancer Screening (Age 54 to [de-identified] with 30 pack year hx, current smoker or quit within past 13 years) - n/a    Tetanus - needs  Influenza Vaccine - 9/19  Pneumonia Vaccine - Prevnar 3/18 Pneumovax 7/31/19  Zostavax - needs     Breast Cancer Screening - declines  Cervical Cancer Screening - n/a  Osteoporosis Screening - declines  Chlamydia Screen - n/a    AAA Screening - n/a    Falls screening - n/a    Current Outpatient Medications   Medication Sig Dispense Refill    predniSONE (DELTASONE) 20 MG tablet Take 2 tablets by mouth daily for 5 days 10 tablet 0    calcitRIOL (ROCALTROL) 0.25 MCG capsule Take 1 capsule by mouth daily 90 capsule 3    levothyroxine (SYNTHROID) 75 MCG tablet TAKE 1 TABLET BY MOUTH ONCE DAILY. 90 tablet 3    hydrALAZINE (APRESOLINE) 50 MG tablet TAKE 1 TABLET BY MOUTH 3 TIMES A  tablet 3    NIFEdipine (ADALAT CC) 60 MG extended release tablet TAKE 1 TABLET BY MOUTH 2 TIMES A  tablet 3    metoprolol tartrate (LOPRESSOR) 50 MG tablet TAKE 1 TABLET BY MOUTH 2 TIMES A  tablet 3    pravastatin (PRAVACHOL) 80 MG tablet TAKE 1 TABLET BY MOUTH ONE TIME A DAY 90 tablet 3    vitamin D (ERGOCALCIFEROL) 1.25 MG (68793 UT) CAPS capsule TAKE 1 CAPSULE BY MOUTH ONCE A WEEK 36 capsule 5    acetaminophen (TYLENOL) 500 MG tablet Take 500 mg by mouth every 6 hours as needed for Pain       No current facility-administered medications for this visit. Orders Placed This Encounter   Medications    predniSONE (DELTASONE) 20 MG tablet     Sig: Take 2 tablets by mouth daily for 5 days     Dispense:  10 tablet     Refill:  0         All medications reviewed and reconciled, including OTC and herbal medications. Updated list given to patient.        Patient Active Problem List   Diagnosis    CKD [facility]ronic [facility] disease[facility] IV ([facility]    Essential hypertension    AUSTIN (generalized anxiety disorder)    Acquired hypothyroidism    Secondary hyperparathyroidism of renal origin ([facility]    Dyslipidemia       Past Medical History:   Diagnosis Date    Acquired hypothyroidism 8/30/2016  Cancer [facility]     cervical       Past Surgical History:   Procedure Laterality Date    KNEE SURGERY      ORTHOPEDIC SURGERY      TONSILLECTOMY         Allergies   Allergen Reactions    Atarax [Hydroxyzine] Nausea And Vomiting       Social History     Socioeconomic History    Marital status:      Spouse name: Not on file    Number of children: Not on file    Years of education: Not on file    Highest education level: Not on file   Occupational History    Not on file   Social Needs    Financial resource strain: Not on file    Food insecurity     Worry: Not on file     Inability: Not on file    Transportation needs     Medical: Not on file     Non-medical: Not on file   Tobacco Use    Smoking status: Former Smoker     Packs/day: 0.50     Years: 30.00     Pack years: 15.00     Quit date: 2016     Years since quittin.3    Smokeless tobacco: Never Used   Substance and Sexual Activity    Alcohol use: No    Drug use: No    Sexual activity: Not on file   Lifestyle    Physical activity     Days per week: Not on file     Minutes per session: Not on file    Stress: Not on file   Relationships    Social connections     Talks on phone: Not on file     Gets together: Not on file     Attends Rastafari service: Not on file     Active member of club or organization: Not on file     Attends meetings of clubs or organizations: Not on file     Relationship status: Not on file    Intimate partner violence     Fear of current or ex partner: Not on file     Emotionally abused: Not on file     Physically abused: Not on file     Forced sexual activity: Not on file   Other Topics Concern    Not on file   Social History Narrative    Not on file       Family History   Problem Relation Age of Onset    Depression Mother     Diabetes Mother     Heart Disease Mother     Cancer Father     High Blood Pressure Father     Stroke Father          I have reviewed the patient's past medical history, past surgical history, allergies, medications, social and family history and I have made updates where appropriate.       Review of Systems  Positive responses are highlighted in bold    Constitutional:  Fever, Chills, Night Sweats, Fatigue, Unexpected changes in weight  Eyes:  Eye discharge, Eye pain, Eye redness, Visual disturbances   HENT:  Ear pain, Tinnitus, Nosebleeds, Trouble swallowing, Hearing loss, Sore throat  Cardiovascular:  Chest Pain, Palpitations, Orthopnea, Paroxysmal Nocturnal Dyspnea  Respiratory:  Cough, Wheezing, Shortness of breath, Chest tightness, Apnea  Gastrointestinal:  Nausea, Vomiting, Diarrhea, Constipation, Heartburn, Blood in stool  Genitourinary:  Difficulty or painful urination, Flank pain, Change in frequency, Urgency  Skin:  Color change, Rash, Itching, Wound  Psychiatric:  Hallucinations, Anxiety, Depression, Suicidal ideation  Hematological:  Enlarged glands, Easy bleeding, Easily bruising  Musculoskeletal:  Joint pain, Back pain, Gait problems, Joint swelling, Myalgias  Neurological:  Dizziness, Headaches, Presyncope, Numbness, Seizures, Tremors  Allergy:  Environmental allergies, Food allergies  Endocrine:  Heat Intolerance, Cold Intolerance, Polydipsia, Polyphagia, Polyuria      Lab Results   Component Value Date     (H) 09/11/2020    K 4.4 09/11/2020     09/11/2020    CO2 24 09/11/2020    BUN 24 (H) 09/11/2020    CREATININE 2.1 (H) 09/11/2020    GLUCOSE 104 09/11/2020    CALCIUM 9.2 09/11/2020    PROT 7.0 07/31/2019    LABALBU 3.9 07/31/2019    BILITOT <0.2 (L) 07/31/2019    ALKPHOS 160 (H) 07/31/2019    AST 16 07/31/2019    ALT 13 07/31/2019    LABGLOM 23 (A) 09/11/2020       Lab Results   Component Value Date    WBC 5.5 04/30/2019    HGB 13.5 04/30/2019    HCT 42.4 04/30/2019    .7 (H) 04/30/2019     04/30/2019     Lab Results   Component Value Date     (H) 06/06/2018    CALCIUM 9.2 09/11/2020    PHOS 3.0 09/11/2020     Lab Results   Component Value Date    TSH 4.090 04/30/2019       PHYSICAL EXAM:  Vitals:    05/12/21 1446   BP: 136/72   Pulse: 66   Resp: 18   Temp: 97.3 °F (36.3 °C)   TempSrc: Infrared   SpO2: 97%   Weight: 234 lb 3.2 oz (106.2 kg)   Height: 5' 0.5\" (1.537 m)     Body mass index is 44.99 kg/m². VS Reviewed  General Appearance: A&O x 3, No acute distress,well developed and well- nourished  Head: normocephalic and atraumatic  Eyes: pupils equal, round, and reactive to light, extraocular eye movements intact, conjunctivae and eye lids without erythema  Neck: supple and non-tender without mass, no thyromegaly or thyroid nodules, no cervical lymphadenopathy  Pulmonary/Chest: clear to auscultation bilaterally- no wheezes, rales or rhonchi, normal air movement, no respiratory distress or retractions  Cardiovascular: S1 and S2 auscultated w/ RRR. Abdomen: soft, non-tender, non-distended, bowl sounds physiologic,  no rebound or guarding, no masses or hernias noted. Liver and spleen without enlargement. Extremities: no cyanosis, clubbing or edema of the lower extremities. Musculoskeletal: No joint swelling or gross deformity   Left hand: trace nonpitting edema, limited ROM and decreased  strength, negative tinels and phalens  Neuro:  Alert, 5/5 strength globally and symmetrically  Psych: Affect appropriate. Mood normal. Thought process is normal without evidence of depression or psychosis. Good insight and appropriate interaction. Cognition and memory appear to be intact. Skin: warm and dry, no rash or erythema  Lymph:  No cervical, auricular or supraclavicular lymph nodes palpated    ASSESSMENT & PLAN  Julito Clay was seen today for arm pain and medication check. Diagnoses and all orders for this visit:    Essential hypertension    Left hand pain  -     [facility] ([facility]) - Trevor Erwin MD  -     XR HAND LEFT (MIN 3 VIEWS); Future  -     predniSONE (DELTASONE) 20 MG tablet;  Take 2 tablets by mouth daily for 5 days    Localized swelling on left hand  -     XR HAND LEFT (MIN 3 VIEWS); Future  -     predniSONE (DELTASONE) 20 MG tablet; Take 2 tablets by mouth daily for 5 days      - BP stable and reasonable control  - con't hydralazine, lopressor and Nifedipine  - ? If hand pain due to carpal tunnel, negative phalens and tinels, but her symptoms are consistent with this  - obtain xray to rule out any OA as possible cause  - no NSAIDS due to CKD  - add prednisone  - obtain EMG    DISPOSITION    Return in about 1 year (around 5/12/2022) for chronic conditions. Jose M Fernandez released without restrictions. PATIENT COUNSELING    Counseling was provided today regarding the following topics: Healthy eating habits, Regular exercise, substance abuse and healthy sleep habits. Jose M Fernandez received counseling on the following healthy behaviors: medication adherence    Patient given educational materials on: See Attached    I have instructed Jose M Rebecca to complete a self tracking handout on Blood Pressures  and instructed them to bring it with them to her next appointment. Barriers to learning and self management: none    Discussed use, benefit, and side effects of prescribed medications. Barriers to medication compliance addressed. All patient questions answered. Pt voiced understanding.        Electronically signed by Vic Romberg, APRN - CNP on 5/12/2021 at 3:23 PM

## 2021-05-12 NOTE — PATIENT INSTRUCTIONS
Patient Education        Carpal Tunnel Syndrome: Exercises  Introduction  Here are some examples of exercises for you to try. The exercises may be suggested for a condition or for rehabilitation. Start each exercise slowly. Ease off the exercises if you start to have pain. You will be told when to start these exercises and which ones will work best for you. Warm-up stretches  When you no longer have pain or numbness, you can do exercises to help prevent carpal tunnel syndrome from coming back. Do not do any stretch or movement that is uncomfortable or painful. 1. Rotate your wrist up, down, and from side to side. Repeat 4 times. 2. Stretch your fingers far apart. Relax them, and then stretch them again. Repeat 4 times. 3. Stretch your thumb by pulling it back gently, holding it, and then releasing it. Repeat 4 times. How to do the exercises  Prayer stretch   1. Start with your palms together in front of your chest just below your chin. 2. Slowly lower your hands toward your waistline, keeping your hands close to your stomach and your palms together until you feel a mild to moderate stretch under your forearms. 3. Hold for at least 15 to 30 seconds. Repeat 2 to 4 times. Wrist flexor stretch   1. Extend your arm in front of you with your palm up. 2. Bend your wrist, pointing your hand toward the floor. 3. With your other hand, gently bend your wrist farther until you feel a mild to moderate stretch in your forearm. 4. Hold for at least 15 to 30 seconds. Repeat 2 to 4 times. Wrist extensor stretch   1. Repeat steps 1 through 4 of the stretch above, but begin with your extended hand palm down. Follow-up care is a key part of your treatment and safety. Be sure to make and go to all appointments, and call your doctor if you are having problems. It's also a good idea to know your test results and keep a list of the medicines you take. Where can you learn more?   Go to [email or web address] and sign in to your [employer] account. Enter E388 in the [facility] box to learn more about \"Carpal Tunnel Syndrome: Exercises. \"     If you do not have an account, please click on the \"Sign Up Now\" link. Current as of: November 16, 2020               Content Version: 12.8  © 0482-4002 Healthwise, [facility]. Care instructions adapted under license by [facility] ([facility]). If you have questions about a medical condition or this instruction, always ask your healthcare professional. [place] [address] any warranty or liability for your use of this information.

## 2021-05-13 NOTE — TELEPHONE ENCOUNTER
----- Message from MARQUEZ Hauser CNP sent at 5/12/2021  4:35 PM EDT -----  Let Jason Marques know her hand xray does show some mild arthritis and thinning of the bones in her hand, but otherwise normal. Let's see what her EMG shows and go from there. Also she is due for DEXA scan, a bone density test for osteoporosis. I know she doesn't want to do mammogram or colorectal cancer screening, but I wasn't sure if she was willing to do the DEXA scan. No

## 2021-05-17 NOTE — TELEPHONE ENCOUNTER
Left detailed msg on machine, with [facility] scheduling's phone # , to call and schedule dexa scan

## 2021-05-19 NOTE — TELEPHONE ENCOUNTER
Pt informed and verbalized understanding.   Pt hasn't tried extra strength tylenol but see will give it a try

## 2021-05-19 NOTE — TELEPHONE ENCOUNTER
Pt is calling in asking If there is any other medication that will help the inflammation and arthritis in her left hand. She knows that nasreen told her she cant be on the prednisone all the time but is wondering if there is anything else she can take to help with the pain. Pt states she cant afford the DEXA scan right now but is saving up for it.       Pharmacy- [facility]- [address]

## 2021-05-19 NOTE — TELEPHONE ENCOUNTER
Has she tried extra strength tylenol? She cannot take anti-inflammatories because of her kidneys and HTN. But she can take extra strength tylenol.

## 2021-05-20 NOTE — TELEPHONE ENCOUNTER
Called pt to see if she was still interested in getting labs done that Mabel wild had ordered in November. She is and she is going to go on Tuesday when she has her Dexa Bone Scan.

## 2021-05-25 NOTE — TELEPHONE ENCOUNTER
Controlled Substance Monitoring:    Acute and Chronic Pain Monitoring:   RX Monitoring 5/25/2021   Attestation -   Periodic Controlled Substance Monitoring No signs of potential drug abuse or diversion identified. I sent a Rx for tramadol which she can take for pain. Looks like she still needs to get her EMG scheduled, they have been trying to reach her to schedule it.

## 2021-05-25 NOTE — TELEPHONE ENCOUNTER
Pt notified. Pt is wondering what she can do for the pain. Says prednisone helps but knows she cant take it every day. Cant close left hand at all, it swells up to elbow at times. When it does hurt pain is a 10/10.

## 2021-05-26 NOTE — TELEPHONE ENCOUNTER
----- Message from MARQUEZ Khoury CNP sent at 5/26/2021  8:24 AM EDT -----  Let Reese Gloria know most of her labs are stable and within appropriate ranges. She is a little anemic, and her red blood cells are very large in size. This can indicate a Y47 or folic acid deficiency. I would like to check these blood levels, orders placed. Does she drink any alcohol? Also her thyroid lab is very mildly underactive, but at this point I would con't the current dose of the Synthroid. I would like to just recheck her thyroid lab in 3 months to ensure stability. Order placed.

## 2021-06-30 NOTE — PROGRESS NOTES
** PLEASE SIGN, DATE AND TIME CERTIFICATION BELOW AND RETURN TO [facility] OUTPATIENT REHABILITATION (FAX #: [phone]). ATTEST/CO-SIGN IF ACCESSING VIA [employer]. THANK YOU.**    I certify that I have examined the patient below and determined that Physical Medicine and Rehabilitation service is necessary and that I approve the established plan of care for up to 90 days or as specifically noted. Attestation, signature or co-signature of physician indicates approval of certification requirements.    ________________________ ____________ __________  Physician Signature   Date   Time   [address] THERAPY  [x] EVALUATION  [] DAILY NOTE (LAND) [] DAILY NOTE (AQUATIC ) [] PROGRESS NOTE [] DISCHARGE NOTE    [x] [facility]   [] [facility] 90    [] [facility]   [] [facility]    Date: 2021  Patient Name:  Margo Reyes  : 1950  MRN: [id number]  CSN: [id number]    Referring Practitioner Kateri Gitelman, MD   Diagnosis Pain in left shoulder [M25.512]    Treatment Diagnosis Decreased ROM, strength, decreased ADLs   Date of Evaluation 21      Functional Outcome Measure Used UEFI   Functional Outcome Score 0/80 (21)       Insurance: Primary: Payor: Willard Blanchard /  /  / ,   Secondary:    Authorization Information: [employer] covered, modalitied covered   Visit # 1, 1/10 for progress note   Visits Allowed: No visit limit   Recertification Date: 57   Physician Follow-Up: 21   Physician Orders: Evaluate and treat -Lefft shoulder and hand ROM    Pertinent History: PT had a COVID shot in 2021, began having pain middle deltoid and down to hand in April , resulting decreased ROM, muscle pain. Dexascan neg, x-rays negative, EMG revealed a moderate degree of Carpal tunnel syndrome.       SUBJECTIVE: cooperative, min guarded,     Social/Functional History:  Medications and Allergies have been reviewed and are listed on the [address] lives alone in a single story home   Task Prior Level of Function  (current level of function addressed below)   ADLs  Independent   [address] Enjoys grandchildren    Driving Active    Work [employer]. Occupation: works at [employer]	[place] for [employer]. OBJECTIVE:  Hand Dominance right handed   Palpation Tightness noted middle deltoid,    Observation    Posture Kyphotic fwd head,    Edema WFL           ADL's Significant extra time needed for ADLs, mod difficulty with hooking bra at shoulder and hand,    Bed Mobility  NA   Transfers MI   Balance MI       Sensation occasional numbness R hand, left occasional numbness    Coordination Impaired: decreased opposition L1-5 0.5 cm with pain in thenar eminencce           LEFT UPPER EXTREMITY  RANGE OF MOTION    AROM PROM COMMENTS         Shoulder Flexion 91 121    Shoulder Extension      Shoulder Abduction 81 108    Shoulder Adduction      Shoulder External Rotation 54 63    Shoulder Internal Rotation  65 To lateral side of pocket   Shoulder Range of Motion is [place]  []      Elbow Flexion      Elbow Extension      Forearm Pronation      Forearm Supination      Elbow Range of Motion is [place]  [x]      Wrist Flexion 17 22    Wrist Extension 31 36    Wrist Radial Deviation      Wrist Ulnar Deviation      Wrist Range of Motion is [place]  []   If no measurement is recorded, no formal assessment was completed for that motion.      HAND STRENGTH AND COORDINATION     Right Left    Strength Setting:      Pinch Strength Tip Pinch:      Lateral Pinch      3 Point Pinch        Coordination 9 Hole Peg Test             RIGHT UPPER EXTREMITY  HAND RANGE OF MOTION    AROM PROM COMMENTS   Thump MP      Thumb IP      Thumb Radial Abduction/Adduction      Thumb Palmar Abduction/Adduction      Thumb Opposition         Index Finger MP 50 57    Index Finger PIP 66 70    Index Patient limited by fatigue  [x]  Patient limited by pain   []  Patient limited by other medical complications  []  Other:     Assessment: Pt presents with a decline in independent functioning. Patient takes significantly longer to complete daily tasks in prep for dressing for work due to pain and decreased ROM throughout L UE. SHe currently would benefit from additional skilled OT services to address increasing independence and strength needed for increasing functional use of the left UE in daily tasks through modalities, pain management, ROM and eventual strengthening tasks to increase independent functioning. Areas for Improvement: impaired activity tolerance, impaired ROM, impaired strength, and pain  Prognosis: good    GOALS:  Patient Goal: Increase use of my Left arm    Short Term Goals:  Time Frame: 10 sessions  *  Patient will increase left shoulder AROM greater than 105 degrees flexion, 90 degrees abduction, to waistband for IR and 65  degrees ER with the arm at the side to increase ease and ability to put on a shirt. *  Patient will increase left shoulder PROM greater than 130 degrees flexion, 115 degrees abduction, 70 degrees IR and 70 degrees ER in abduction to increase ease and ability to wash and style hair. *  Pt. will demo independence with HEP for Left shoulder, wrist and hand for improved motion and decreased pain for daily dressing tasks  * Patient will improve PROM of left wrist to 30 TPM of L 2 to 185, L3 195, L4 210, and L 5 215  to be able to increase flexibility to grasp a water bottle. * Patient will improve AROM of left wrist to 25, HALL L2 165, L3 185, L4 to 175 to be able to sustain  on cooking tools. Long Term Goals:  Time Frame: 8 weeks  *  Patient will improve UEFS to at least 40/80  * Patient will demonstrate ability to reach overhead for an object with affected extremity without increased pain.     Patient Education:   [x]  HEP/Education Completed: Plan of Care, Goals,  [employer] Access Code for HEP: Access Code: Regulo Bowden URL: [email or web address]   Date: 06/30/2021   Prepared by: Marium Mcnamara    Exercises   Seated Scapular Retraction - 1 x daily - 7 x weekly - 3 sets - 10 reps   Seated Shoulder Flexion Towel Slide at Table Top - 2 x daily - 7 x weekly - 1 sets - 10 reps   Seated Shoulder Abduction Towel Slide at Table Top - 2 x daily - 7 x weekly - 1 sets - 10 reps   Doorway Pec Stretch at 90 Degrees Abduction - 2 x daily - 7 x weekly - 1 sets - 10 reps   Seated Wrist Flexion Stretch - 2 x daily - 7 x weekly - 1 sets - 10 reps   Seated Wrist Extension Stretch - 2 x daily - 7 x weekly - 1 sets - 10 reps   Seated Straight Fist AROM - 2 x daily - 7 x weekly - 1 sets - 10 reps   Hand AROM Composite Flexion - 2 x daily - 7 x weekly - 1 sets - 10 reps   Seated Wrist Flexor Hook Fist Tendon Gliding - 21 x daily - 7 x weekly - 1 sets - 10 reps   Hand PROM MCP Flexion - 2 x daily - 7 x weekly - 1 sets - 10 reps   Seated Finger PIP Flexion PROM - 2 x daily - 7 x weekly - 1 sets - 10 reps   Seated Finger DIP Flexion PROM - 12 x daily - 7 x weekly - 1 sets - 10 reps     []  No new Education completed  []  Reviewed Prior HEP      [x]  Patient verbalized and/or demonstrated understanding of education provided. []  Patient unable to verbalize and/or demonstrate understanding of education provided. Will continue education. [x]  Barriers to learning: none    PLAN:  Treatment Recommendations: Strengthening, Range of Motion, Endurance Training, Manual Therapy - Soft Tissue Mobilization, Manual Therapy - Joint Manipulation, Pain Management, Home Exercise Program, Patient Education, and Self-Care Education and Training    [x]  Plan of care initiated. Plan to see patient 2 times per week for 8 weeks to address the treatment planned outlined above.   []  Continue with current plan of care  []  Modify plan of care as follows:    []  Hold pending physician visit  [] Discharge    Time In 0830   Time Out 0928   Timed Code Minutes: 20 min   Total Treatment Time: 58 min       Electronically Signed by: JOSE Curtis OTR/L [id number]

## 2021-07-07 NOTE — PROGRESS NOTES
** PLEASE SIGN, DATE AND TIME CERTIFICATION BELOW AND RETURN TO [facility] OUTPATIENT REHABILITATION (FAX #: [phone]). ATTEST/CO-SIGN IF ACCESSING VIA [employer]. THANK YOU.**    I certify that I have examined the patient below and determined that Physical Medicine and Rehabilitation service is necessary and that I approve the established plan of care for up to 90 days or as specifically noted. Attestation, signature or co-signature of physician indicates approval of certification requirements.    ________________________ ____________ __________  Physician Signature   Date   Time   [address] THERAPY  [x] EVALUATION  [] DAILY NOTE (LAND) [] DAILY NOTE (AQUATIC ) [] PROGRESS NOTE [] DISCHARGE NOTE    [x] [facility]   [] [facility] 90    [] 6[facility]   [] Moise Pan    Date: 2021  Patient Name:  Federico Vincent  : 1950  MRN: [id number]  CSN: [id number]    Referring Practitioner José Miguel Barber MD   Diagnosis Pain in left shoulder [M25.512]    Treatment Diagnosis Decreased ROM, strength, decreased ADLs   Date of Evaluation 21      Functional Outcome Measure Used UEFI   Functional Outcome Score 0/80 (21)       Insurance: Primary: Payor: Ivett More /  /  / ,   Secondary:    Authorization Information: [employer] covered, modalitied covered   Visit # 2, 2/10 for progress note   Visits Allowed: No visit limit   Recertification Date:    Physician Follow-Up: 21   Physician Orders: Evaluate and treat -Left shoulder and hand ROM    Pertinent History: PT had a COVID shot in 2021, began having pain middle deltoid and down to hand in April , resulting decreased ROM, muscle pain. Dexascan neg, x-rays negative, EMG revealed a moderate degree of Carpal tunnel syndrome. SUBJECTIVE: See below in pain section.     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 Extreme stiffness in B shoulders, has to get up 2 hours prior to work just to loosen up and get ready. Difficulty with toileting- pain with pulling pants up and down. Pain in B shoulders is about the same. X in shaded column indicates Activity Completed Today   Modalities Parameters/  Location  Notes/Comments   Paraffin L hand 10 min  x    B shoulder hot packs 20 min  x Done while completing ROM to B hands         Manual Therapy Time/  Technique  Notes/Comments                     Exercises   Sets/  Sec Reps  Notes/Comments   Table slides sh flexion 1 10 x Pt reported decreased pain in shoulders after heat pack with table slides. Scap retraction 1 10 x    PROM R 1-5 individually and composite 1 5 x Increased pain and tingling in middle finger. With PROM to little finger, pain in thumb. PROM L 2-5 individually and composite 1 5 x    AROM B opposition , fist 1 10 x R hand able to oppose to index/middle  L hand able to oppose to index/middle/ring  Unable to make a full fist with either hand   Tendon Glides    New next tx   Pulley  1 10 x    Sitting dowel: chest press, horiz abd/add 1 10 x Also educated pt on completing this ex with hands clasped together so that she can complete while at work. Activities Time    Notes/Comments                       Specific Interventions Next Treatment: Pulsed Ultrasound Left shoulder. Parafin Left hand  (or fluido PRN), PROM/ AROM Left shoulder, wrist and hand/ digits. Strengthening PRN.    Activity/Treatment Tolerance:  []  Patient tolerated treatment well  []  Patient limited by fatigue  [x]  Patient limited by pain   []  Patient limited by other medical complications  []  Other:     Assessment:Pt  Has increased R shoulder and hand pain/stiffness. Called  And requested orders to treat B shoulders and hands. After heat packs to B shoulders, pt reported decreased pain and improved tolerance for shoulder ROM. Did well with addition of new ex. Areas for Improvement: impaired activity tolerance, impaired ROM, impaired strength, and pain  Prognosis: good    GOALS:  Patient Goal: Increase use of my Left arm    Short Term Goals:  Time Frame: 10 sessions  *  Patient will increase left shoulder AROM greater than 105 degrees flexion, 90 degrees abduction, to waistband for IR and 65  degrees ER with the arm at the side to increase ease and ability to put on a shirt. *  Patient will increase left shoulder PROM greater than 130 degrees flexion, 115 degrees abduction, 70 degrees IR and 70 degrees ER in abduction to increase ease and ability to wash and style hair. *  Pt. will demo independence with HEP for Left shoulder, wrist and hand for improved motion and decreased pain for daily dressing tasks  * Patient will improve PROM of left wrist to 30 TPM of L 2 to 185, L3 195, L4 210, and L 5 215  to be able to increase flexibility to grasp a water bottle. * Patient will improve AROM of left wrist to 25, HALL L2 165, L3 185, L4 to 175 to be able to sustain  on cooking tools. Long Term Goals:  Time Frame: 8 weeks  *  Patient will improve UEFS to at least 40/80  * Patient will demonstrate ability to reach overhead for an object with affected extremity without increased pain. Patient Education:   [x]  HEP/Education Completed: Plan of Care, Goals,    Berenice Vela Access Code for HEP: Access Code: [email or web address]   URL: [email or web address]   Date: 06/30/2021   Prepared by: Toi Never    Exercises   Seated Scapular Retraction - 1 x daily - 7 x weekly - 3 sets - 10 reps   Seated Shoulder Flexion Towel Slide at Table Top - 2 x daily - 7 x weekly - 1 sets - 10 reps   Seated Shoulder Abduction Towel Slide at Table Top - 2 x daily - 7 x weekly - 1 sets - 10 reps   Doorway Pec Stretch at 90 Degrees Abduction - 2 x daily - 7 x weekly - 1 sets - 10 reps   Seated Wrist Flexion Stretch - 2 x daily - 7 x weekly - 1 sets - 10 reps   Seated Wrist Extension Stretch - 2 x daily - 7 x weekly - 1 sets - 10 reps   Seated Straight Fist AROM - 2 x daily - 7 x weekly - 1 sets - 10 reps   Hand AROM Composite Flexion - 2 x daily - 7 x weekly - 1 sets - 10 reps   Seated Wrist Flexor Hook Fist Tendon Gliding - 21 x daily - 7 x weekly - 1 sets - 10 reps   Hand PROM MCP Flexion - 2 x daily - 7 x weekly - 1 sets - 10 reps   Seated Finger PIP Flexion PROM - 2 x daily - 7 x weekly - 1 sets - 10 reps   Seated Finger DIP Flexion PROM - 12 x daily - 7 x weekly - 1 sets - 10 reps     []  No new Education completed  []  Reviewed Prior HEP      [x]  Patient verbalized and/or demonstrated understanding of education provided. []  Patient unable to verbalize and/or demonstrate understanding of education provided. Will continue education. [x]  Barriers to learning: none    PLAN:  Treatment Recommendations: Strengthening, Range of Motion, Endurance Training, Manual Therapy - Soft Tissue Mobilization, Manual Therapy - Joint Manipulation, Pain Management, Home Exercise Program, Patient Education, and Self-Care Education and Training    [x]  Plan of care initiated. Plan to see patient 2 times per week for 8 weeks to address the treatment planned outlined above.   []  Continue with current plan of care  []  Modify plan of care as follows:    []  Hold pending physician visit  []  Discharge    Time In 1045   Time Out 1135   Timed Code Minutes: 30   Total Treatment Time: 50       Electronically Signed by: JOSE Locke/AKIL, [address] [place] [address], [facility]

## 2021-07-12 NOTE — PROGRESS NOTES
[address] THERAP[address]  [] EVALUATION  [x] DAILY NOTE (LAND) [] DAILY NOTE (AQUATIC ) [] PROGRESS NOTE [] DISCHARGE NOTE    [x] OUTPATIENT REHABILITATION CENTER [facility]   [] [place] [address]    [] [facility]   [] [facility]    Date: 2021  Patient Name:  Amanda Pearl  : 1950  MRN: [id number]  CSN: [id number]    Referring Practitioner Davis Tracy MD   Diagnosis Pain in left shoulder [M25.512]    Treatment Diagnosis Decreased ROM, strength, decreased ADLs   Date of Evaluation 21      Functional Outcome Measure Used UEFI   Functional Outcome Score 0/80 (21)       Insurance: Primary: Payor: Niru Ba /  /  / ,   Secondary:    Authorization Information: [employer] covered, modalitied covered   Visit # 3, 3/10 for progress note   Visits Allowed: No visit limit   Recertification Date:    Physician Follow-Up: 21   Physician Orders: Evaluate and treat -Left shoulder and hand ROM    Pertinent History: PT had a COVID shot in 2021, began having pain middle deltoid and down to hand in April , resulting decreased ROM, muscle pain. Dexascan neg, x-rays negative, EMG revealed a moderate degree of Carpal tunnel syndrome. 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 Relates the heat from last session helped. OBJECTIVE:      TREATMENT   Precautions: None for therapy,    Pain:  7/10 in R hand, shoulders \"ache\".      X in shaded column indicates Activity Completed Today   Modalities Parameters/  Location  Notes/Comments   Paraffin L hand 10 min  x    B shoulder hot packs 20 min  x Done while completing ROM to B hands         Manual Therapy Time/  Technique  Notes/Comments                     Exercises   Sets/  Sec Reps  Notes/Comments   Table slides sh flexion 1 10 x Pt reported decreased pain in shoulders after heat pack with table slides. Scap retraction, backward circles 1 10 x    PROM R 1-5 individually and composite 1 5 x Increased pain and tingling in middle finger. With PROM to little finger, pain in thumb. PROM L 2-5 individually and composite 1 5 x    AROM B opposition , fist 1 10 x R hand able to oppose to index/middle  L hand able to oppose to index/middle/ring  Unable to make a full fist with either hand   Tendon Glides    New next tx   Pulley  1 10 x    Sitting dowel: chest press, horiz abd/add 1 10  Also educated pt on completing this ex with hands clasped together so that she can complete while at work. Activities Time    Notes/Comments                       Specific Interventions Next Treatment: Pulsed Ultrasound Left shoulder. Parafin Left hand  (or fluido PRN), PROM/ AROM Left shoulder, wrist and hand/ digits. Strengthening PRN. Activity/Treatment Tolerance:  []  Patient tolerated treatment well  []  Patient limited by fatigue  [x]  Patient limited by pain   []  Patient limited by other medical complications  []  Other:     Assessment: Patient is progressing slowly towards goals. Areas for Improvement: impaired activity tolerance, impaired ROM, impaired strength, and pain  Prognosis: good    GOALS:  Patient Goal: Increase use of my Left arm    Short Term Goals:  Time Frame: 10 sessions  *  Patient will increase left shoulder AROM greater than 105 degrees flexion, 90 degrees abduction, to waistband for IR and 65  degrees ER with the arm at the side to increase ease and ability to put on a shirt. *  Patient will increase left shoulder PROM greater than 130 degrees flexion, 115 degrees abduction, 70 degrees IR and 70 degrees ER in abduction to increase ease and ability to wash and style hair.     *  Pt. will demo independence with HEP for Left shoulder, wrist and hand for improved motion and decreased pain for daily dressing tasks  * Patient will improve PROM of left wrist to 30 TPM of L 2 to 185, L3 195, L4 210, and L 5 215  to be able to increase flexibility to grasp a water bottle. * Patient will improve AROM of left wrist to 25, HALL L2 165, L3 185, L4 to 175 to be able to sustain  on cooking tools. Long Term Goals:  Time Frame: 8 weeks  *  Patient will improve UEFS to at least 40/80  * Patient will demonstrate ability to reach overhead for an object with affected extremity without increased pain. Patient Education:   []  HEP/Education Completed: Plan of Care, Goals,    [address] Access Code for HEP: Access Code: [email or web address]   URL: [email or web address]   Date: 06/30/2021   Prepared by: Felipa Valdez    Exercises   Seated Scapular Retraction - 1 x daily - 7 x weekly - 3 sets - 10 reps   Seated Shoulder Flexion Towel Slide at Table Top - 2 x daily - 7 x weekly - 1 sets - 10 reps   Seated Shoulder Abduction Towel Slide at Table Top - 2 x daily - 7 x weekly - 1 sets - 10 reps   Doorway Pec Stretch at 90 Degrees Abduction - 2 x daily - 7 x weekly - 1 sets - 10 reps   Seated Wrist Flexion Stretch - 2 x daily - 7 x weekly - 1 sets - 10 reps   Seated Wrist Extension Stretch - 2 x daily - 7 x weekly - 1 sets - 10 reps   Seated Straight Fist AROM - 2 x daily - 7 x weekly - 1 sets - 10 reps   Hand AROM Composite Flexion - 2 x daily - 7 x weekly - 1 sets - 10 reps   Seated Wrist Flexor Hook Fist Tendon Gliding - 21 x daily - 7 x weekly - 1 sets - 10 reps   Hand PROM MCP Flexion - 2 x daily - 7 x weekly - 1 sets - 10 reps   Seated Finger PIP Flexion PROM - 2 x daily - 7 x weekly - 1 sets - 10 reps   Seated Finger DIP Flexion PROM - 12 x daily - 7 x weekly - 1 sets - 10 reps     []  No new Education completed  [x]  Reviewed Prior HEP      [x]  Patient verbalized and/or demonstrated understanding of education provided. []  Patient unable to verbalize and/or demonstrate understanding of education provided. Will continue education.   [x]  Barriers to learning: none    PLAN:  Treatment Recommendations: Strengthening, Range of Motion, Endurance Training, Manual Therapy - Soft Tissue Mobilization, Manual Therapy - Joint Manipulation, Pain Management, Home Exercise Program, Patient Education, and Self-Care Education and Training    []  Plan of care initiated. Plan to see patient 2 times per week for 8 weeks to address the treatment planned outlined above.   [x]  Continue with current plan of care  []  Modify plan of care as follows:    []  Hold pending physician visit  []  Discharge    Time In 0915   Time Out 1000   Timed Code Minutes: 30 min   Total Treatment Time: 45 min       ALISE ZULUAGA/AKIL #[id number]

## 2021-07-14 NOTE — PROGRESS NOTES
[address]  [] EVALUATION  [x] DAILY NOTE (LAND) [] DAILY NOTE (AQUATIC ) [] PROGRESS NOTE [] DISCHARGE NOTE    [x] OUTPATIENT REHABILITATION CENTER [facility]   [] [place] [address]    [] [facility]   [] Tanvi Lights    Date: 2021  Patient Name:  Antonia Frias  : 1950  MRN: [id number]  CSN: [id number]    Referring Practitioner Tammi Hampton MD   Diagnosis Pain in left shoulder [M25.512]    Treatment Diagnosis Decreased ROM, strength, decreased ADLs   Date of Evaluation 21      Functional Outcome Measure Used UEFI   Functional Outcome Score 0/80 (21)       Insurance: Primary: Payor: Hunter Mireles /  /  / ,   Secondary:    Authorization Information: [employer] covered, modalitied covered   Visit # 4, 4/10 for progress note   Visits Allowed: No visit limit   Recertification Date:    Physician Follow-Up: 21   Physician Orders: Evaluate and treat -Left shoulder and hand ROM    Pertinent History: PT had a COVID shot in 2021, began having pain middle deltoid and down to hand in April , resulting decreased ROM, muscle pain. Dexascan neg, x-rays negative, EMG revealed a moderate degree of Carpal tunnel syndrome. SUBJECTIVE: Pt reports her R hand is worse than her L today, especially middle finger- numbness present.      OBJECTIVE:      TREATMENT   Precautions: None for therapy,    Pain:  At beginning of session: shoulder pain at 7/10  After moist heat packs: 4/10  Fluido done for B hands- pt reported she likes paraffin better    X in shaded column indicates Activity Completed Today   Modalities Parameters/  Location  Notes/Comments   Paraffin L hand 10 min      B shoulder hot packs 15 min  x Done while B  Hands in fluidotherapy   Fluido B hands 15 min  x          Manual Therapy Time/  Technique  Notes/Comments                     Exercises   Sets/  Sec Reps  Notes/Comments   Table slides sh flexion 1 10 x Scap retraction, backward circles 1 15 x    PROM R 1-5 individually and composite 1 5 x Increased pain and tingling in middle finger. PROM L 2-5 individually and composite 1 5 x    AROM B opposition , fist 1 10  R hand able to oppose to index/middle  L hand able to oppose to index/middle/ring  Unable to make a full fist with either hand   B Tendon Glides 1 5 x Pain with this but gave good effort   Pulley  1 15 x    Sitting dowel: chest press, horiz abd/add 1 15 x Chest press only; horiz abd/add too painful today   Composite Fist     AROM  R: 6.5 cm from tip of D3 to [facility]  L: 2.5 cm   PROM   R: 1.5 cm   L: finger tips to base of palm                         Activities Time    Notes/Comments                       Specific Interventions Next Treatment: Pulsed Ultrasound Left shoulder. B hand paraffin with coban wrap, PROM/ AROM Left shoulder, wrist and hand/ digits. Strengthening PRN. Activity/Treatment Tolerance:  []  Patient tolerated treatment well  []  Patient limited by fatigue  [x]  Patient limited by pain   []  Patient limited by other medical complications  []  Other:     Assessment: Patient is progressing slowly towards goals. Is wearing B edema control gloves and she likes these. Did well with increased reps for there ex. Did well with education on tendon glides but had difficulty with all positions. Areas for Improvement: impaired activity tolerance, impaired ROM, impaired strength, and pain  Prognosis: good    GOALS:  Patient Goal: Increase use of my Left arm    Short Term Goals:  Time Frame: 10 sessions  *  Patient will increase left shoulder AROM greater than 105 degrees flexion, 90 degrees abduction, to waistband for IR and 65  degrees ER with the arm at the side to increase ease and ability to put on a shirt.     *  Patient will increase left shoulder PROM greater than 130 degrees flexion, 115 degrees abduction, 70 degrees IR and 70 degrees ER in abduction to increase ease and ability to wash and style hair. *  Pt. will demo independence with HEP for Left shoulder, wrist and hand for improved motion and decreased pain for daily dressing tasks  * Patient will improve PROM of left wrist to 30 TPM of L 2 to 185, L3 195, L4 210, and L 5 215  to be able to increase flexibility to grasp a water bottle. * Patient will improve AROM of left wrist to 25, HALL L2 165, L3 185, L4 to 175 to be able to sustain  on cooking tools. Long Term Goals:  Time Frame: 8 weeks  *  Patient will improve UEFS to at least 40/80  * Patient will demonstrate ability to reach overhead for an object with affected extremity without increased pain. Patient Education:   []  HEP/Education Completed: Plan of Care, Goals,    [address] Access Code for HEP: Access Code: [email or web address]   URL: [email or web address]   Date: 06/30/2021   Prepared by: Coty Lacrosse    Exercises   Seated Scapular Retraction - 1 x daily - 7 x weekly - 3 sets - 10 reps   Seated Shoulder Flexion Towel Slide at Table Top - 2 x daily - 7 x weekly - 1 sets - 10 reps   Seated Shoulder Abduction Towel Slide at Table Top - 2 x daily - 7 x weekly - 1 sets - 10 reps   Doorway Pec Stretch at 90 Degrees Abduction - 2 x daily - 7 x weekly - 1 sets - 10 reps   Seated Wrist Flexion Stretch - 2 x daily - 7 x weekly - 1 sets - 10 reps   Seated Wrist Extension Stretch - 2 x daily - 7 x weekly - 1 sets - 10 reps   Seated Straight Fist AROM - 2 x daily - 7 x weekly - 1 sets - 10 reps   Hand AROM Composite Flexion - 2 x daily - 7 x weekly - 1 sets - 10 reps   Seated Wrist Flexor Hook Fist Tendon Gliding - 21 x daily - 7 x weekly - 1 sets - 10 reps   Hand PROM MCP Flexion - 2 x daily - 7 x weekly - 1 sets - 10 reps   Seated Finger PIP Flexion PROM - 2 x daily - 7 x weekly - 1 sets - 10 reps   Seated Finger DIP Flexion PROM - 12 x daily - 7 x weekly - 1 sets - 10 reps     []  No new Education completed  [x]  Reviewed Prior HEP      [x]  Patient

## 2021-07-16 NOTE — CODE DOCUMENTATION
PT arrived via rapid response team. PT lost pulse in transit and CPR is going on upon arrival. PT was given 1amp of EPI in route at 1024. PT end title CO2 was 13.

## 2021-07-16 NOTE — PROCEDURES
BRONCHOSCOPY    Indication: Hemoptysis    Medical necessity    Sedation: None      EBL:  None. Findings:   Patient had endotracheal tube above the vinod but did require pulled back by 2 cm. There was poor perfusion to the pulmonary parenchyma as they were dull and dusky. Airway compression was active during bronchoscopy and airway would adequately collapse during CPR. No mass. Patient did have some blood within the airway bilaterally, but no active hemorrhage. No trauma identified. Suspect blood was pulmonary edema from cardiac arrest.    Samples:   None. Complications:  None    Procedure:  Utilizing the endotracheal tube, the bronchoscope was advanced to the level of the trachea and subsequently the vinod. The vinod was noted to be crisp. Scope was taken to the left and right lung fields. Samples taken as noted above. Bronchoscope was withdrawn. Electronically signed by Lovella Class Corrinne Angry M.D.

## 2021-07-16 NOTE — PROGRESS NOTES
Patient coded and was bagged by Yamini Jama with 100% ambu bag. Patient successfully intubated by Dr. Danuta Han with a 7.5 et tube. Tube secured with a erasmo et tube barcenas . Good breathsounds ausulated and bilateral breathsounds varified. Refer to RN and Dr. Danuta Han notes.

## 2021-07-16 NOTE — PROGRESS NOTES
A size 7.5 endotracheal tube was successfully placed using a size 3 blade.  The Lot number for he endotracheal tube was [id number]

## 2021-07-16 NOTE — PROCEDURES
ICU PROCEDURE - CVC PLACEMENT:        INDICATION: Cardiac arrest    SITE: Left Femoral Vein      Medical necessity. STERILE PREP: Prior to the procedure all involved washed their hands. Full maximum sterile field/barrier technique was followed (with cap and mask, gloves and sterile gown, as well as broad field sterile drapes). Local disinfection was performed with broad field application of orange dyed chloraprep solution, which was allowed to dry fully prior to the initiation of the procedure. ESTIMATED BLOOD LOSS: Minimal    ULTRASOUND GUIDANCE USED: No    PROCEDURE:  Using modified seldinger technique, the appropriate vessel was located with the introducer needle subsequent to the use of a 22g x 1.5 inch \"\" needle. The flexible J-tip wire was passed without difficulty or resistance, followed by minimal skin incisions over the introducer needle. The introducer needle was withdrawn and discarded, maintaining manual control of the guidewire at all times. After dilation of the tract, a preflushed 3 lumen CVC catheter was advanced over the guidewire without difficulty or resistance to its full extent. The guidewire was withdrawn and discarded and good return of nonpulsatile, dark venous blood assured through all lumes, with easy flushing of the lumens. The line was sutured in place. After hemostasis was assured, an op site was applied and all sharps and materials were discarded appropriately. EBL < 5 ml.     COMPLICATIONS: None        Electronically signed by     Amanda Landry MD on 7/16/2021 at 11:31 AM

## 2021-07-16 NOTE — ED NOTES
Bed: 003A  Expected date:   Expected time:   Means of arrival:   Comments:  Cr Grubbs RN  07/16/21 1025

## 2021-07-16 NOTE — FLOWSHEET NOTE
21 1120   Encounter Summary   Services provided to: Family   Referral/Consult From: Nurse;Nursing Supervisor/Manager; Other disciplines   Support System Parent; Family members   Continue Visiting No  ( The pt is )   Complexity of Encounter High   Length of Encounter 1 hour   Crisis   Type Code   Grief and Life Adjustment   Type Death   The 66 yr old patient coded at the lower level of the hospital and was transported to the ED. At the time of death the family (The pt's son Juventino Nguyễn and his wife) were tearfully present. I escorted the pt's family to the family room and I Spiritually supportive as when the pt . I provided prayer and emotional support to the bereaving family. I answered additional questions. As I prayed the Lords Prayer the family joined me. The pts family expressed their thanks for the spiritual support. I also provided a short debriefing with the staff that was actively present. Plan: No further support is needed at this time.

## 2021-07-16 NOTE — ED PROVIDER NOTES
Anjelica Quinn 13 COMPLAINT       Chief Complaint   Patient presents with    Code       Nurses Notes reviewed and I agree except as noted in the HPI. HISTORY OF PRESENT ILLNESS    Antonia Frias is a 66 y.o. pleasant female who presents to the emergency department as a CODE BLUE from a break room in the basement of the hospital.  Patient was initially called as a rapid response for shortness of breath. Per staff upon arrival initial concern for been shortness of breath however patient then became apneic unresponsive and lost pulses and CPR was started immediately by staff on the scene. For further details of initial care please see the MDM. REVIEW OF SYSTEMS     Review of Systems   Unable to perform ROS: Patient unresponsive        PAST MEDICAL HISTORY    has a past medical history of Acquired hypothyroidism and Cancer ([facility] [address] SURGICAL HISTORY      has a past surgical history that includes orthopedic surgery; Tonsillectomy; and knee surgery. CURRENT MEDICATIONS       Discharge Medication List as of 7/16/2021 10:30 PM      CONTINUE these medications which have NOT CHANGED    Details   calcitRIOL (ROCALTROL) 0.25 MCG capsule Take 1 capsule by mouth daily, Disp-90 capsule, R-3Normal      levothyroxine (SYNTHROID) 75 MCG tablet TAKE 1 TABLET BY MOUTH ONCE DAILY. , Disp-90 tablet,R-3Normal      hydrALAZINE (APRESOLINE) 50 MG tablet TAKE 1 TABLET BY MOUTH 3 TIMES A DAY, Disp-270 tablet,R-3Normal      NIFEdipine (ADALAT CC) 60 MG extended release tablet TAKE 1 TABLET BY MOUTH 2 TIMES A DAY, Disp-180 tablet,R-3Normal      metoprolol tartrate (LOPRESSOR) 50 MG tablet TAKE 1 TABLET BY MOUTH 2 TIMES A DAY, Disp-180 tablet,R-3Normal      pravastatin (PRAVACHOL) 80 MG tablet TAKE 1 TABLET BY MOUTH ONE TIME A DAY, Disp-90 tablet,R-3Normal      vitamin D (ERGOCALCIFEROL) 1.25 MG (31153 UT) CAPS capsule TAKE 1 CAPSULE BY MOUTH ONCE A WEEK, Disp-36 capsule,R-5Normal      acetaminophen (TYLENOL) 500 MG tablet Take 500 mg by mouth every 6 hours as needed for Pain             ALLERGIES     is allergic to atarax [hydroxyzine]. FAMILY HISTORY     She indicated that her mother is . She indicated that her father is . family history includes Cancer in her father; Depression in her mother; Diabetes in her mother; Heart Disease in her mother; High Blood Pressure in her father; Stroke in her father. SOCIAL HISTORY      reports that she quit smoking about 5 years ago. She has a 15.00 pack-year smoking history. She has never used smokeless tobacco. She reports that she does not drink alcohol and does not use drugs. PHYSICAL EXAM     INITIAL VITALS:  vitals were not taken for this visit. CONSTITUTIONAL: [CPR in progress, bagging by ET tube]  HEAD: [Normocephalic, atraumatic]  EYES: [Pupils dilated, non responsive]  CARDIOVASCULAR: [No heart sounds upon auscultation.]  PULMONARY: [Good bilateral breath sounds with bagging]  ABDOMEN: [Inspection normal, without surgical scars. Soft, non-tender.]  MUSCULOSKELETAL: [Extremities nontender to palpation. No gross deformity or evidence of external trauma. No clubbing, cyanosis, or edema.]  SKIN: [Warm, dry. No jaundice, rash, urticaria, or petechiae]  NEUROLOGIC:  GCS 3, no movement or response to painful stimuli.]    DIFFERENTIAL DIAGNOSIS:   PEA, arrhythmia, acs/mi, pe, and others    DIAGNOSTIC RESULTS       RADIOLOGY: non-plain film images(s) such as CT,Ultrasound and MRI are read by the radiologist.    No orders to display     [] Visualized and interpreted by me   [] Radiologist's Wet Read Report Reviewed   [] Discussed withRadiologist.    LABS:   Labs Reviewed - No data to display    EMERGENCY DEPARTMENT COURSE:   Vitals: There were no vitals filed for this visit. 1002 Code Blue called to Lower level breakroom. No pulse. CPR started in recliner.   1004 transitioned to table, no pulse, CPR resumed. 1006 No pulse, AED applied, CPR resumed, ambu bagging  1008 No pulse, No shock advised, CPR resumed, ambu bagging  1010 Pulse, placed on monitor, Intubated by Dr. Jeff South, color change achieve, bilat lung sounds  1012 thready pulse, IO inserted left leg, Atropine given  1013 Lost pulse Epi 1 mg given  1014 0.9 normal saline wide open  1015 No pulse, PEA, CPR restarted, Epi 1mg given, calcium chloride 1 gm, bicarb 1 amp   1016 No pulse, CPR restarted  1017 IO inserted right leg. End tidal Co2 22, D50 1 amp given  1018 No pulse, CPR resumed, EPI 1mg  1019 Bicarb 1 am, end tidal CO2 29  1020 Pulse present, rhythm   1021 Placed on stretcher, EPI drip started  1022 To ED  1024 No pulse, CPR started, Epi 1 mg given  1026 Arrival to ED room 3    Patient was brought to the emergency department after pulses were lost in the elevator while attempting to transfer her upstairs. Patient was brought to the emergency department and appeared to be in pulseless V. tach therefore was shocked once. Multiple additional rounds of epinephrine were given in addition to bicarb drip, epinephrine drip, and further amps of bicarbonate. Patient's family was brought into the room to be advised of her status. Patient continued to appear in PEA. Elizebeth Gourd device was applied and CPR was continued throughout. Patient underwent fiberoptic bronchoscopy by Dr. Jeff South to confirm chest tube placement in the emergency department and evaluate for hemorrhage or other cause. Bedside bronc showed ET tube in position, no significant hemorrhage identified. Patient appeared to be in V. fib, 360 J shock was administered. Family was brought into the room a second time. Patient had been coded for over 45 minutes. No cardiac activity was seen on bedside ultrasound.   Efforts were ceased after discussion with family and time of death was called at 10:59 AM.      CRITICAL CARE:   Critical Care  Performed by: Lauren Cortes MD  Authorized by: Harris Grace Judy Alvarenga MD     Critical care provider statement:     Critical care time (minutes):  45    Critical care time was exclusive of:  Separately billable procedures and treating other patients    Critical care was necessary to treat or prevent imminent or life-threatening deterioration of the following conditions:  Cardiac failure    Critical care was time spent personally by me on the following activities:  Development of treatment plan with patient or surrogate, discussions with consultants, evaluation of patient's response to treatment, examination of patient, obtaining history from patient or surrogate, ordering and performing treatments and interventions, pulse oximetry and re-evaluation of patient's condition    I assumed direction of critical care for this patient from another provider in my specialty: no          CONSULTS:  None, patient transported by Rapid response/inpatient code team with Dr. Sylvania Cheadle to ED    PROCEDURES:  None    FINAL IMPRESSION      1. Cardiopulmonary arrest [facility])          DISPOSITION/PLAN       PATIENT REFERRED TO:  No follow-up provider specified. DISCHARGE MEDICATIONS:  Discharge Medication List as of 2021 10:30 PM          (Please note that portions of this note were completed with a voice recognition program.  Efforts were made to edit the dictations but occasionally words are mis-transcribed.)    Provider:  I personally performed the services described in the documentation, reviewed and edited the documentation which was dictated, and it accurately records my words and actions.     Mercedes Garcia MD 21 3:24 PM                Mercedes Garcia MD  21 [id number]

## 2021-07-16 NOTE — PROCEDURES
ICU PROCEDURE - ENDOTRACHEAL INTUBATION    Man Campo     MRN#: [id number]  21      [email or web address] [email or web address]     : 1950      INDICATION: Cardiac arrest        ANESTHESIA:   []Ketamine  []Ativan  [] Morphine  []Propofol  [x]Other medications: Etomidate. Versed. ESTIMATED BLOOD LOSS:  None. COMPLICATIONS:  [x]N/A  [] Other:    LARYNGOSCOPIC AIRWAY GRADE (CORMACK-LEHANE):[]1  []2a  [x]2b []3  []4        INTUBATION EQUIPMENT USED:  [x] Direct laryngoscope only    OUTCOME: Successful placement of #7.5 Taperguard Evac endotracheal via   [x]Oral route    INSERTION DEPTH: 23    cm from   [x]lip           CONFIRMATION OF TUBE POSITION:   [x]Capnography - Strong & repeatable exhaled CO2 detection   [x]Multiple point auscultation   [x]SpO2 response   []STAT X-ray   []Bronchoscopic assessment    UNUSUAL FINDINGS:    PROCEDURE:     Using direct laryngoscopy, the vocal cords were visualized and the endotracheal tube was placed through the cords under direct vision. Good breath sounds were auscultated bilaterally without sounds over abdomen. Appropriate strong & repeatable exhaled CO2 detection was confirmed.        Electronically signed by Peter Coppola MD on 2021 at 11:30 AM

## 2021-07-16 NOTE — ED NOTES
bronchoscope completed by Dr. Umesh Bang to confirm placement of ETT. ETT adjusted to 18 at the lips.      Ml Angeles RN  07/16/21 430 [facility] Jania Stewart  07/16/21 6856

## 2021-07-16 NOTE — DISCHARGE SUMMARY
Responded to CODE BLUE. Please see the multiple code summaries. Patient apparently was complaining of shortness of breath and not feeling well. She subsequently had syncopal episode. Upon arrival, patient had no pulse and CPR was initiated. She received multiple medications including epinephrine bicarbonate and calcium chloride and glucose. She regained spontaneous circulation. She was arousable somewhat agitated. She was not protecting her airway and required intubation. She lost pulse, but had rhythm. She was successfully resuscitated and subsequently transported to the ER. While in the elevator, she lost her pulse again. She was brought to the emergency room with active CPR. She underwent resuscitative efforts with fleeting and intermittent pulse response. Please review code summary for details. Patient did not survive the code. CPR was stopped. Bedside point-of-care echocardiogram was utilized to verify that there was no pumping action of the heart. The heart was at standstill. There was no pleural effusion or evidence of cardiac tamponade. Patient was pronounced at 10:58 a.m. on 7/16/2021. Cause of death is sudden cardiac arrest secondary to pulseless electrical activity. CC time > 60 minutes. Time does not include procedures. Electronically signed by Kt Delong MD.

## 2021-07-16 NOTE — PROGRESS NOTES
1002 Code Blue called to Lower level breakroom. No pulse. CPR started in recliner. 1004 transitioned to table, no pulse, CPR resumed. 1006 No pulse, AED applied, CPR resumed, ambu bagging  1008 No pulse, No shock advised, CPR resumed, ambu bagging  1010 Pulse, placed on monitor, Intubated by Dr. Lakeisha Galeas, color change achieve, bilat lung sounds  1012 thready pulse, IO inserted left leg, Atropine given  1013 Lost pulse Epi 1 mg given  1014 0.9 normal saline wide open  1015 No pulse, PEA, CPR restarted, Epi 1mg given, calcium chloride 1 gm, bicarb 1 amp   1016 No pulse, CPR restarted  1017 IO inserted right leg.  End tidal Co2 22, D50 1 amp given  1018 No pulse, CPR resumed, EPI 1mg  1019 Bicarb 1 am, end tidal CO2 29  1020 Pulse present, rhythm   1021 Placed on stretcher, EPI drip started  1022 To ED  1024 No pulse, CPR started, Epi 1 mg given  1026 Arrival to ED room 3  Electronically signed by Abundio Johnson RN on 7/16/2021 at 10:41 AM

## 2021-07-19 ENCOUNTER — TELEPHONE (OUTPATIENT)
Dept: FAMILY MEDICINE CLINIC | Age: 71
End: 2021-07-19

## 2021-07-19 NOTE — TELEPHONE ENCOUNTER
----- Message from Louetta Essex sent at 7/19/2021 11:41 AM EDT -----  Subject: Message to Provider    QUESTIONS  Information for Provider? Needs to know who is going to sign the death   certificate for this patient.  ---------------------------------------------------------------------------  --------------  [address]  What is the best way for the office to contact you? Do not leave any   message, patient will call back for answer  Preferred Call Back Phone Number? [phone]  ---------------------------------------------------------------------------  --------------  SCRIPT ANSWERS  Relationship to Patient? Third Party  Representative Name?  Esequiel Suarez 57

## 2021-07-19 NOTE — TELEPHONE ENCOUNTER
Spoke with  at [address] home. She states they do not fax due to the phone numbers coming across the death cert. They will have someone drop it off by the office to be signed by Swathi Cheney.

## 2021-07-19 NOTE — TELEPHONE ENCOUNTER
Email? Or fax? I would prefer fax if they can. Rec'd call from [facility] of abnormal MRI results. Will perfectserve Neurology.

## 2021-07-21 ENCOUNTER — APPOINTMENT (OUTPATIENT)
Dept: OCCUPATIONAL THERAPY | Age: 71
End: 2021-07-21
Payer: COMMERCIAL

## 2021-07-23 ENCOUNTER — APPOINTMENT (OUTPATIENT)
Dept: OCCUPATIONAL THERAPY | Age: 71
End: 2021-07-23
Payer: COMMERCIAL

## 2021-07-28 ENCOUNTER — APPOINTMENT (OUTPATIENT)
Dept: OCCUPATIONAL THERAPY | Age: 71
End: 2021-07-28
Payer: COMMERCIAL